# Patient Record
Sex: FEMALE | Race: WHITE | RURAL
[De-identification: names, ages, dates, MRNs, and addresses within clinical notes are randomized per-mention and may not be internally consistent; named-entity substitution may affect disease eponyms.]

---

## 2017-01-03 RX ORDER — CITALOPRAM 20 MG/1
TABLET, FILM COATED ORAL
Qty: 30 TAB | Refills: 3 | Status: SHIPPED | OUTPATIENT
Start: 2017-01-03 | End: 2017-02-27 | Stop reason: SDUPTHER

## 2017-02-16 ENCOUNTER — TELEPHONE (OUTPATIENT)
Dept: FAMILY MEDICINE CLINIC | Age: 32
End: 2017-02-16

## 2017-02-16 RX ORDER — AMLODIPINE BESYLATE 5 MG/1
TABLET ORAL
Qty: 30 TAB | Refills: 4 | OUTPATIENT
Start: 2017-02-16

## 2017-02-16 NOTE — TELEPHONE ENCOUNTER
Pt is requesting refill. Vale's pt she has never seen you. Would you like to refill or does pt need an appointment with you?

## 2017-02-16 NOTE — TELEPHONE ENCOUNTER
Pt made an appointment to be seen on 2/27 could we send in enough amlodipine to last her until her appt. She is completely out.

## 2017-02-17 RX ORDER — AMLODIPINE BESYLATE 5 MG/1
TABLET ORAL
Qty: 30 TAB | Refills: 0 | Status: SHIPPED | OUTPATIENT
Start: 2017-02-17 | End: 2017-02-27 | Stop reason: SINTOL

## 2017-02-27 ENCOUNTER — OFFICE VISIT (OUTPATIENT)
Dept: FAMILY MEDICINE CLINIC | Age: 32
End: 2017-02-27

## 2017-02-27 VITALS
HEART RATE: 88 BPM | TEMPERATURE: 97.6 F | BODY MASS INDEX: 34.66 KG/M2 | DIASTOLIC BLOOD PRESSURE: 88 MMHG | SYSTOLIC BLOOD PRESSURE: 148 MMHG | WEIGHT: 208 LBS | RESPIRATION RATE: 18 BRPM | HEIGHT: 65 IN | OXYGEN SATURATION: 100 %

## 2017-02-27 DIAGNOSIS — F41.1 GAD (GENERALIZED ANXIETY DISORDER): ICD-10-CM

## 2017-02-27 DIAGNOSIS — I10 ESSENTIAL HYPERTENSION: Primary | ICD-10-CM

## 2017-02-27 RX ORDER — CITALOPRAM 20 MG/1
TABLET, FILM COATED ORAL
Qty: 90 TAB | Refills: 1 | Status: SHIPPED | OUTPATIENT
Start: 2017-02-27 | End: 2018-02-05 | Stop reason: SDUPTHER

## 2017-02-27 RX ORDER — METHYLDOPA 500 MG/1
500 TABLET, FILM COATED ORAL 3 TIMES DAILY
Qty: 270 TAB | Refills: 1 | Status: SHIPPED | OUTPATIENT
Start: 2017-02-27 | End: 2017-03-28 | Stop reason: ALTCHOICE

## 2017-02-27 NOTE — MR AVS SNAPSHOT
Visit Information Date & Time Provider Department Dept. Phone Encounter #  
 2/27/2017  2:00 PM Heide Sandra MD [place] [address] [facility] [phone] [id number] Upcoming Health Maintenance Date Due DTaP/Tdap/Td series (1 - Tdap) 4/2/2006 PAP AKA CERVICAL CYTOLOGY 4/2/2006 INFLUENZA AGE 9 TO ADULT 8/1/2016 Allergies as of 2/27/2017  Review Complete On: 2/27/2017 By: Heide Sandra MD  
  
 Severity Noted Reaction Type Reactions Augmentin [Amoxicillin-pot Clavulanate]  02/25/2015    Other (comments) Intolerance to drug Current Immunizations  Never Reviewed No immunizations on file. Not reviewed this visit Vitals BP  
  
  
  
  
  
 148/88 (BP 1 Location: Left arm, BP Patient Position: Sitting) Vitals History BMI and BSA Data Body Mass Index Body Surface Area  
 34.61 kg/m 2 2.08 m 2 Preferred Pharmacy Pharmacy Name Phone [facility] [place] [address], [place]  [address] [phone] Your Updated Medication List  
  
   
This list is accurate as of: 2/27/17  2:43 PM.  Always use your most recent med list. amLODIPine 5 mg tablet Commonly known as:  Elyn Coffer TAKE ONE TABLET BY MOUTH ONCE DAILY  
  
 citalopram 20 mg tablet Commonly known as:  CELEXA  
TAKE ONE TABLET BY MOUTH ONCE DAILY predniSONE 20 mg tablet Commonly known as:  Therman Rail Take 5 tablets day one, take 4 tablets day two, take 3 tablets day three, take 2 tablets day four, take 1 tablet day five. pseudoephedrine  mg CR tablet Commonly known as:  SUDAFED 12 HOUR Take 1 Tab by mouth two (2) times daily as needed for Congestion. [address] [facility] (68) 0.25-35 mg-mcg Tab Generic drug:  norgestimate-ethinyl estradiol Introducing [facility] & HEALTH SERVICES!    
 [facility] introduces [employer] patient portal. Now you can access parts of your medical record, email your doctor's office, and request medication refills online. 1. In your internet browser, go to [email or web address] 2. Click on the First Time User? Click Here link in the Sign In box. You will see the New Member Sign Up page. 3. Enter your [facility] Access Code exactly as it appears below. You will not need to use this code after youve completed the sign-up process. If you do not sign up before the expiration date, you must request a new code. · [facility] Access Code: [email or web address] Expires: 2/28/2017  3:51 PM 
 
4. Enter the last four digits of your Social Security Number (xxxx) and Date of Birth (mm/dd/yyyy) as indicated and click Submit. You will be taken to the next sign-up page. 5. Create a [facility] ID. This will be your [facility] login ID and cannot be changed, so think of one that is secure and easy to remember. 6. Create a [facility] password. You can change your password at any time. 7. Enter your Password Reset Question and Answer. This can be used at a later time if you forget your password. 8. Enter your e-mail address. You will receive e-mail notification when new information is available in [address] 9. Click Sign Up. You can now view and download portions of your medical record. 10. Click the Download Summary menu link to download a portable copy of your medical information. If you have questions, please visit the Frequently Asked Questions section of the [facility] website. Remember, [facility] is NOT to be used for urgent needs. For medical emergencies, dial 911. Now available from your iPhone and Android! Please provide this summary of care documentation to your next provider. Your primary care clinician is listed as Mile Ferrari. If you have any questions after today's visit, please call [phone].

## 2017-02-27 NOTE — PROGRESS NOTES
Tato Browne is a 32 y.o. female who presents with the following:  Chief Complaint   Patient presents with    Anxiety    Hypertension       Anxiety   The history is provided by the patient (Patient doing well on her current medication of citalopram for generalized anxiety disorder without feeling overly stressed). Pertinent negatives include no chest pain, no abdominal pain, no headaches and no shortness of breath. Hypertension    The history is provided by the patient (The patient had a rather high salt lunch which resulted in her blood pressure being elevated today and she is on a rather relatively small amount of amlodipine but is considering a pregnancy this summer). Associated symptoms include anxiety. Pertinent negatives include no chest pain, no orthopnea, no palpitations, no PND, no blurred vision, no headaches, no peripheral edema, no dizziness and no shortness of breath. Allergies   Allergen Reactions    Augmentin [Amoxicillin-Pot Clavulanate] Other (comments)     Intolerance to drug       Current Outpatient Prescriptions   Medication Sig    citalopram (CELEXA) 20 mg tablet TAKE ONE TABLET BY MOUTH ONCE DAILY    methyldopa (ALDOMET) 500 mg tablet Take 1 Tab by mouth three (3) times daily.  predniSONE (DELTASONE) 20 mg tablet Take 5 tablets day one, take 4 tablets day two, take 3 tablets day three, take 2 tablets day four, take 1 tablet day five.  pseudoephedrine CR (SUDAFED 12 HOUR) 120 mg CR tablet Take 1 Tab by mouth two (2) times daily as needed for Congestion.  SPRINTEC, 28, 0.25-35 mg-mcg per tablet      No current facility-administered medications for this visit. No past medical history on file. Past Surgical History:   Procedure Laterality Date    HX GYN       I, Para I    HX TONSILLECTOMY         No family history on file.     Social History     Social History    Marital status:      Spouse name: N/A    Number of children: 1    Years of education: N/A     Occupational History    insurance      Social History Main Topics    Smoking status: Former Smoker    Smokeless tobacco: Never Used    Alcohol use Yes      Comment: social    Drug use: No    Sexual activity: Yes     Partners: Male     Birth control/ protection: Pill     Other Topics Concern    None     Social History Narrative       Review of Systems   Eyes: Negative for blurred vision. Respiratory: Negative for shortness of breath. Cardiovascular: Negative for chest pain, palpitations, orthopnea and PND. Gastrointestinal: Negative for abdominal pain. Neurological: Negative for dizziness and headaches. Visit Vitals    /88 (BP 1 Location: Left arm, BP Patient Position: Sitting)    Pulse 88    Temp 97.6 °F (36.4 °C) (Oral)    Resp 18    Ht 5' 5\" (1.651 m)    Wt 208 lb (94.3 kg)    LMP 02/24/2017 (Exact Date)    SpO2 100%    BMI 34.61 kg/m2     Physical Exam   Constitutional: She is oriented to person, place, and time and well-developed, well-nourished, and in no distress. The patient is overweight   HENT:   Head: Normocephalic and atraumatic. Right Ear: External ear normal.   Left Ear: External ear normal.   Mouth/Throat: Oropharynx is clear and moist.   Eyes: Conjunctivae and EOM are normal. Pupils are equal, round, and reactive to light. Right eye exhibits no discharge. Left eye exhibits no discharge. Neck: Normal range of motion. Neck supple. No tracheal deviation present. No thyromegaly present. Cardiovascular: Normal rate, regular rhythm, normal heart sounds and intact distal pulses. Exam reveals no gallop and no friction rub. No murmur heard. Pulmonary/Chest: Effort normal and breath sounds normal. No respiratory distress. She has no wheezes. She exhibits no tenderness. Abdominal: Soft. Bowel sounds are normal. She exhibits no distension and no mass. There is no tenderness. There is no rebound and no guarding.    Musculoskeletal: She exhibits no edema or tenderness. Lymphadenopathy:     She has no cervical adenopathy. Neurological: She is alert and oriented to person, place, and time. She has normal reflexes. No cranial nerve deficit. She exhibits normal muscle tone. Gait normal. Coordination normal.   Skin: Skin is warm and dry. No rash noted. No erythema. No pallor. Psychiatric: Mood, memory, affect and judgment normal.         ICD-10-CM ICD-9-CM    1. Essential hypertension I10 401.9 methyldopa (ALDOMET) 500 mg tablet      METABOLIC PANEL, COMPREHENSIVE      LIPID PANEL   2. NEREYDA (generalized anxiety disorder) F41.1 300.02 citalopram (CELEXA) 20 mg tablet     The patient will return and approximately 4 weeks to have her lab drawn and her blood pressure rechecked on the new medication. Orders Placed This Encounter    METABOLIC PANEL, COMPREHENSIVE     Standing Status:   Future     Standing Expiration Date:   8/27/2017    LIPID PANEL     Standing Status:   Future     Standing Expiration Date:   8/27/2017    citalopram (CELEXA) 20 mg tablet     Sig: TAKE ONE TABLET BY MOUTH ONCE DAILY     Dispense:  90 Tab     Refill:  1    methyldopa (ALDOMET) 500 mg tablet     Sig: Take 1 Tab by mouth three (3) times daily.      Dispense:  270 Tab     Refill:  1       Follow-up Disposition: Not on Gerianne Osgood, MD

## 2017-03-28 ENCOUNTER — OFFICE VISIT (OUTPATIENT)
Dept: FAMILY MEDICINE CLINIC | Age: 32
End: 2017-03-28

## 2017-03-28 DIAGNOSIS — Z00.00 ANNUAL PHYSICAL EXAM: Primary | ICD-10-CM

## 2017-03-28 RX ORDER — LABETALOL 100 MG/1
100 TABLET, FILM COATED ORAL 2 TIMES DAILY
Qty: 60 TAB | Refills: 11 | Status: SHIPPED | OUTPATIENT
Start: 2017-03-28 | End: 2018-02-05 | Stop reason: ALTCHOICE

## 2017-03-28 RX ORDER — PHENTERMINE HYDROCHLORIDE 30 MG/1
30 CAPSULE ORAL
COMMUNITY
End: 2018-09-13 | Stop reason: ALTCHOICE

## 2017-03-28 RX ORDER — AMLODIPINE BESYLATE 5 MG/1
5 TABLET ORAL DAILY
COMMUNITY
Start: 2017-02-17 | End: 2017-03-28 | Stop reason: ALTCHOICE

## 2017-03-28 NOTE — PROGRESS NOTES
Chief Complaint   Patient presents with    Hypertension         HPI:       is a 32 y.o. female.  mother of an 7 yo daughter, she works at [employer] in [facility]. Hypertensive and previously managed with Amlodipine. Seen elsewhere and switched to Aldomet (which she chose not to take), she was advised by her OB to consider Labetalol instead. Not pregnant now, but considering this soon. Allergies   Allergen Reactions    Augmentin [Amoxicillin-Pot Clavulanate] Other (comments)     Intolerance to drug       Current Outpatient Prescriptions   Medication Sig    phentermine 30 mg capsule Take 30 mg by mouth every morning.  labetalol (NORMODYNE) 100 mg tablet Take 1 Tab by mouth two (2) times a day. Indications: hypertension    citalopram (CELEXA) 20 mg tablet TAKE ONE TABLET BY MOUTH ONCE DAILY    SPRINTEC, 28, 0.25-35 mg-mcg per tablet      No current facility-administered medications for this visit. No past medical history on file. ROS:  Denies fever, chills, cough, chest pain, SOB,  nausea, vomiting, or diarrhea. Denies wt loss, wt gain, hemoptysis, hematochezia or melena. Physical Examination:    LMP 02/24/2017 (Exact Date)    General: Alert and Ox3, Fluent speech  HEENT:  NC/AT, EOMI, OP: clear  Neck:  Supple, no adenopathy, JVD, mass or bruit  Chest:  Clear to Ausculation, without wheezes, rales, rubs or ronchi  Cardiac: RRR  Abdomen:  +BS, soft, nontender without palpable HSM  Extremities:  No cyanosis, clubbing or edema  Neurologic:  Ambulatory without assist, CN 2-12 grossly intact. Moves all extremities. Skin: no rash  Lymphadenopathy: no cervical or supraclavicular nodes      ASSESSMENT AND PLAN:     1.  Labs today including Vitamin D, TSH, Lipids, CBC, CMP  2. Home BP monitoring. Contingency: Labetalol 100 mg BID  3. RTC in 6 months or when she becomes pregnant.     Orders Placed This Encounter    VITAMIN D, 25 HYDROXY    TSH 3RD GENERATION    CBC WITH AUTOMATED DIFF    LIPID PANEL    METABOLIC PANEL, COMPREHENSIVE    MS COLLECTION VENOUS BLOOD,VENIPUNCTURE    DISCONTD: amLODIPine (NORVASC) 5 mg tablet     Sig: Take 5 mg by mouth daily.  phentermine 30 mg capsule     Sig: Take 30 mg by mouth every morning.  labetalol (NORMODYNE) 100 mg tablet     Sig: Take 1 Tab by mouth two (2) times a day.  Indications: hypertension     Dispense:  60 Tab     Refill:  [address] Leeroy Dubon MD, [address]

## 2017-03-28 NOTE — PATIENT INSTRUCTIONS
If you have any questions regarding [employer], you may call [employer] support at [phone]. If you have any questions regarding [employer], you may call [employer] support at [phone].

## 2017-03-28 NOTE — MR AVS SNAPSHOT
Visit Information Date & Time Provider Department Dept. Phone Encounter #  
 3/28/2017  1:30 PM Brooke Edwards MD [place] [id number] Upcoming Health Maintenance Date Due DTaP/Tdap/Td series (1 - Tdap) 4/2/2006 PAP AKA CERVICAL CYTOLOGY 4/2/2006 INFLUENZA AGE 9 TO ADULT 8/1/2016 Allergies as of 3/28/2017  Review Complete On: 3/28/2017 By: Brooke Edwards MD  
  
 Severity Noted Reaction Type Reactions Augmentin [Amoxicillin-pot Clavulanate]  02/25/2015    Other (comments) Intolerance to drug Current Immunizations  Never Reviewed No immunizations on file. Not reviewed this visit You Were Diagnosed With   
  
 Codes Comments Annual physical exam    -  Primary ICD-10-CM: Z00.00 ICD-9-CM: V70.0 Vitals LMP OB Status Smoking Status 02/24/2017 (Exact Date) Having regular periods Former Smoker Preferred Pharmacy Pharmacy Name Phone [facility], [place]  [address] [phone] Your Updated Medication List  
  
   
This list is accurate as of: 3/28/17  2:19 PM.  Always use your most recent med list.  
  
  
  
  
 citalopram 20 mg tablet Commonly known as:  CELEXA  
TAKE ONE TABLET BY MOUTH ONCE DAILY  
  
 labetalol 100 mg tablet Commonly known as:  Bola Carlos Take 1 Tab by mouth two (2) times a day. Indications: hypertension  
  
 phentermine 30 mg capsule Take 30 mg by mouth every morning. [address] () 0.25-35 mg-mcg Tab Generic drug:  norgestimate-ethinyl estradiol Prescriptions Printed Refills  
 labetalol (NORMODYNE) 100 mg tablet 11 Sig: Take 1 Tab by mouth two (2) times a day. Indications: hypertension Class: Print Route: Oral  
  
We Performed the Following CBC WITH AUTOMATED DIFF [41912 CPT(R)] LIPID PANEL [56216 CPT(R)] METABOLIC PANEL, COMPREHENSIVE [38914 CPT(R)] NM COLLECTION VENOUS BLOOD,VENIPUNCTURE [id number] CPT(R)] TSH 3RD GENERATION [70093 CPT(R)] VITAMIN D, 25 HYDROXY [id number] CPT(R)] Patient Instructions If you have any questions regarding [employer], you may call [employer] support at [phone]. Introducing [facility] & HEALTH SERVICES! Yamilet Cunningham introduces [employer] patient portal. Now you can access parts of your medical record, email your doctor's office, and request medication refills online. 1. In your internet browser, go to [email or web address]. [employer] 2. Click on the First Time User? Click Here link in the Sign In box. You will see the New Member Sign Up page. 3. Enter your [employer] Access Code exactly as it appears below. You will not need to use this code after youve completed the sign-up process. If you do not sign up before the expiration date, you must request a new code. · [employer] Access Code: [email or web address] Expires: 6/26/2017  2:19 PM 
 
4. Enter the last four digits of your Social Security Number (xxxx) and Date of Birth (mm/dd/yyyy) as indicated and click Submit. You will be taken to the next sign-up page. 5. Create a [employer] ID. This will be your [employer] login ID and cannot be changed, so think of one that is secure and easy to remember. 6. Create a [employer] password. You can change your password at any time. 7. Enter your Password Reset Question and Answer. This can be used at a later time if you forget your password. 8. Enter your e-mail address. You will receive e-mail notification when new information is available in [address] 9. Click Sign Up. You can now view and download portions of your medical record. 10. Click the Download Summary menu link to download a portable copy of your medical information. If you have questions, please visit the Frequently Asked Questions section of the [employer] website. Remember, [employer] is NOT to be used for urgent needs. For medical emergencies, dial 911. Now available from your iPhone and Android! Please provide this summary of care documentation to your next provider. Your primary care clinician is listed as Yahaira Sotomayor. If you have any questions after today's visit, please call [phone].

## 2017-03-28 NOTE — LETTER
NOTIFICATION RETURN TO WORK / SCHOOL 
 
3/28/2017 2:17 PM 
 
Ms. Mishel Tran [address] [place] [phone] To Whom It May Concern: 
 
Mishel Tran is currently under the care of Portillo Brown. Please excuse from work on 3/20 and 3/21/17.  
 
 
 
Sincerely 
 
, 
 
 
Desmond Polk MD

## 2017-03-29 LAB
25(OH)D3+25(OH)D2 SERPL-MCNC: 31.9 NG/ML (ref 30–100)
ALBUMIN SERPL-MCNC: 4.6 G/DL (ref 3.5–5.5)
ALBUMIN/GLOB SERPL: 2 {RATIO} (ref 1.2–2.2)
ALP SERPL-CCNC: 61 IU/L (ref 39–117)
ALT SERPL-CCNC: 36 IU/L (ref 0–32)
AST SERPL-CCNC: 24 IU/L (ref 0–40)
BASOPHILS # BLD AUTO: 0 X10E3/UL (ref 0–0.2)
BASOPHILS NFR BLD AUTO: 1 %
BILIRUB SERPL-MCNC: 0.7 MG/DL (ref 0–1.2)
BUN SERPL-MCNC: 11 MG/DL (ref 6–20)
BUN/CREAT SERPL: 18 (ref 8–20)
CALCIUM SERPL-MCNC: 9.9 MG/DL (ref 8.7–10.2)
CHLORIDE SERPL-SCNC: 100 MMOL/L (ref 96–106)
CHOLEST SERPL-MCNC: 212 MG/DL (ref 100–199)
CO2 SERPL-SCNC: 23 MMOL/L (ref 18–29)
CREAT SERPL-MCNC: 0.62 MG/DL (ref 0.57–1)
EOSINOPHIL # BLD AUTO: 0 X10E3/UL (ref 0–0.4)
EOSINOPHIL NFR BLD AUTO: 1 %
ERYTHROCYTE [DISTWIDTH] IN BLOOD BY AUTOMATED COUNT: 12.6 % (ref 12.3–15.4)
GLOBULIN SER CALC-MCNC: 2.3 G/DL (ref 1.5–4.5)
GLUCOSE SERPL-MCNC: 95 MG/DL (ref 65–99)
HCT VFR BLD AUTO: 39.9 % (ref 34–46.6)
HDLC SERPL-MCNC: 86 MG/DL
HGB BLD-MCNC: 13.2 G/DL (ref 11.1–15.9)
IMM GRANULOCYTES # BLD: 0 X10E3/UL (ref 0–0.1)
IMM GRANULOCYTES NFR BLD: 0 %
LDLC SERPL CALC-MCNC: 105 MG/DL (ref 0–99)
LYMPHOCYTES # BLD AUTO: 1.8 X10E3/UL (ref 0.7–3.1)
LYMPHOCYTES NFR BLD AUTO: 35 %
MCH RBC QN AUTO: 28.6 PG (ref 26.6–33)
MCHC RBC AUTO-ENTMCNC: 33.1 G/DL (ref 31.5–35.7)
MCV RBC AUTO: 87 FL (ref 79–97)
MONOCYTES # BLD AUTO: 0.4 X10E3/UL (ref 0.1–0.9)
MONOCYTES NFR BLD AUTO: 9 %
NEUTROPHILS # BLD AUTO: 2.8 X10E3/UL (ref 1.4–7)
NEUTROPHILS NFR BLD AUTO: 54 %
PLATELET # BLD AUTO: 326 X10E3/UL (ref 150–379)
POTASSIUM SERPL-SCNC: 4.2 MMOL/L (ref 3.5–5.2)
PROT SERPL-MCNC: 6.9 G/DL (ref 6–8.5)
RBC # BLD AUTO: 4.61 X10E6/UL (ref 3.77–5.28)
SODIUM SERPL-SCNC: 139 MMOL/L (ref 134–144)
TRIGL SERPL-MCNC: 106 MG/DL (ref 0–149)
TSH SERPL DL<=0.005 MIU/L-ACNC: 2.37 UIU/ML (ref 0.45–4.5)
VLDLC SERPL CALC-MCNC: 21 MG/DL (ref 5–40)
WBC # BLD AUTO: 5.1 X10E3/UL (ref 3.4–10.8)

## 2018-01-30 ENCOUNTER — TELEPHONE (OUTPATIENT)
Dept: FAMILY MEDICINE CLINIC | Age: 33
End: 2018-01-30

## 2018-02-05 ENCOUNTER — OFFICE VISIT (OUTPATIENT)
Dept: FAMILY MEDICINE CLINIC | Age: 33
End: 2018-02-05

## 2018-02-05 VITALS
HEIGHT: 65 IN | HEART RATE: 70 BPM | OXYGEN SATURATION: 98 % | RESPIRATION RATE: 16 BRPM | WEIGHT: 209 LBS | SYSTOLIC BLOOD PRESSURE: 138 MMHG | BODY MASS INDEX: 34.82 KG/M2 | DIASTOLIC BLOOD PRESSURE: 88 MMHG

## 2018-02-05 DIAGNOSIS — F41.1 GAD (GENERALIZED ANXIETY DISORDER): ICD-10-CM

## 2018-02-05 DIAGNOSIS — I10 ESSENTIAL HYPERTENSION: Primary | ICD-10-CM

## 2018-02-05 RX ORDER — CITALOPRAM 20 MG/1
TABLET, FILM COATED ORAL
Qty: 90 TAB | Refills: 4 | Status: SHIPPED | OUTPATIENT
Start: 2018-02-05 | End: 2019-05-08 | Stop reason: SDUPTHER

## 2018-02-05 NOTE — PROGRESS NOTES
Chief Complaint   Patient presents with    Medication Evaluation         HPI:       is a 28 y.o. female. . Has 6 yo daughter. Works at [employer]. 3 week trip to [place] last summer. Planning trip to [facility] soon. Tearful at times. Off all meds but would like to resume Celexa during the winter. Allergies   Allergen Reactions    Augmentin [Amoxicillin-Pot Clavulanate] Other (comments)     Intolerance to drug       Current Outpatient Prescriptions   Medication Sig    phentermine 30 mg capsule Take 30 mg by mouth every morning.  labetalol (NORMODYNE) 100 mg tablet Take 1 Tab by mouth two (2) times a day. Indications: hypertension    citalopram (CELEXA) 20 mg tablet TAKE ONE TABLET BY MOUTH ONCE DAILY    SPRINTEC, 28, 0.25-35 mg-mcg per tablet      No current facility-administered medications for this visit. No past medical history on file. ROS:  Denies fever, chills, cough, chest pain, SOB,  nausea, vomiting, or diarrhea. Denies wt loss, wt gain, hemoptysis, hematochezia or melena. Physical Examination:    /88 (BP 1 Location: Left arm, BP Patient Position: Sitting)  Pulse 70  Resp 16  Ht 5' 5\" (1.651 m)  Wt 209 lb (94.8 kg)  SpO2 98%  BMI 34.78 kg/m2    General: Alert and Ox3, Fluent speech  HEENT:  NC/AT, EOMI, OP: clear  Neck:  Supple, no adenopathy, JVD, mass or bruit  Chest:  Clear to Ausculation, without wheezes, rales, rubs or ronchi  Cardiac: RRR  Abdomen:  +BS, soft, nontender without palpable HSM  Extremities:  No cyanosis, clubbing or edema  Neurologic:  Ambulatory without assist, CN 2-12 grossly intact. Moves all extremities. Skin: no rash  Lymphadenopathy: no cervical or supraclavicular nodes      ASSESSMENT AND PLAN:     1. NEREYDA: resume Celexa  2. HTN is well controlled.   3.  Labs today    Orders Placed This Encounter    TSH 3RD GENERATION    CBC WITH AUTOMATED DIFF    METABOLIC PANEL, COMPREHENSIVE    LIPID PANEL Kalie La MD, [address]

## 2018-02-05 NOTE — MR AVS SNAPSHOT
[address] [place] [address] [address] [employer] [address] 
[phone] Patient: Puja Joseph MRN: [id number] Visit Information Date & Time Provider Department Dept. Phone Encounter #  
 2/5/2018  2:30 PM Praneeth PratherHector 72 [phone] [id number] Upcoming Health Maintenance Date Due DTaP/Tdap/Td series (1 - Tdap) 4/2/2006 PAP AKA CERVICAL CYTOLOGY 4/2/2006 Allergies as of 2/5/2018  Review Complete On: 2/5/2018 By: Praneeth Prather MD  
  
 Severity Noted Reaction Type Reactions Augmentin [Amoxicillin-pot Clavulanate]  02/25/2015    Other (comments) Intolerance to drug Current Immunizations  Never Reviewed No immunizations on file. Not reviewed this visit You Were Diagnosed With   
  
 Codes Comments Essential hypertension    -  Primary ICD-10-CM: I10 
ICD-9-CM: 401.9 NEREYDA (generalized anxiety disorder)     ICD-10-CM: F41.1 ICD-9-CM: 300.02 Vitals BP Pulse Resp Height(growth percentile) Weight(growth percentile) SpO2  
 138/88 (BP 1 Location: Left arm, BP Patient Position: Sitting) 70 16 5' 5\" (1.651 m) 209 lb (94.8 kg) 98% BMI OB Status Smoking Status 34.78 kg/m2 Having regular periods Former Smoker BMI and BSA Data Body Mass Index Body Surface Area 34.78 kg/m 2 2.09 m 2 Preferred Pharmacy Pharmacy Name Phone [address] \[facility] [place] [facility]\"" 91, [address] [place] [phone] Your Updated Medication List  
  
   
This list is accurate as of: 2/5/18  3:15 PM.  Always use your most recent med list.  
  
  
  
  
 citalopram 20 mg tablet Commonly known as:  CELEXA  
TAKE ONE TABLET BY MOUTH ONCE DAILY  
  
 labetalol 100 mg tablet Commonly known as:  Dannis Moira Take 1 Tab by mouth two (2) times a day. Indications: hypertension  
  
 phentermine 30 mg capsule Take 30 mg by mouth every morning. [address] [facility] (28) 0.25-35 mg-mcg Tab Generic drug:  norgestimate-ethinyl estradiol We Performed the Following CBC WITH AUTOMATED DIFF [19108 CPT(R)] LIPID PANEL [15997 CPT(R)] METABOLIC PANEL, COMPREHENSIVE [92591 CPT(R)] TSH 3RD GENERATION [44867 CPT(R)] Introducing [facility] SERVICES! Dear Yair Silva: Thank you for requesting a [employer] account. Our records indicate that you already have an active [employer] account. You can access your account anytime at [email or web address]. [employer] Did you know that you can access your hospital and ER discharge instructions at any time in [employer]? You can also review all of your test results from your hospital stay or ER visit. Additional Information If you have questions, please visit the Frequently Asked Questions section of the [employer] website at [email or web address] [email or web address]/. Remember, [employer] is NOT to be used for urgent needs. For medical emergencies, dial 911. Now available from your iPhone and Android! Please provide this summary of care documentation to your next provider. Your primary care clinician is listed as Cade Stewart. If you have any questions after today's visit, please call [phone].

## 2018-02-06 LAB
ALBUMIN SERPL-MCNC: 4.4 G/DL (ref 3.5–5.5)
ALBUMIN/GLOB SERPL: 2.3 {RATIO} (ref 1.2–2.2)
ALP SERPL-CCNC: 60 IU/L (ref 39–117)
ALT SERPL-CCNC: 12 IU/L (ref 0–32)
AST SERPL-CCNC: 13 IU/L (ref 0–40)
BASOPHILS # BLD AUTO: 0 X10E3/UL (ref 0–0.2)
BASOPHILS NFR BLD AUTO: 1 %
BILIRUB SERPL-MCNC: 0.5 MG/DL (ref 0–1.2)
BUN SERPL-MCNC: 10 MG/DL (ref 6–20)
BUN/CREAT SERPL: 16 (ref 9–23)
CALCIUM SERPL-MCNC: 9.1 MG/DL (ref 8.7–10.2)
CHLORIDE SERPL-SCNC: 104 MMOL/L (ref 96–106)
CHOLEST SERPL-MCNC: 166 MG/DL (ref 100–199)
CO2 SERPL-SCNC: 21 MMOL/L (ref 18–29)
CREAT SERPL-MCNC: 0.62 MG/DL (ref 0.57–1)
EOSINOPHIL # BLD AUTO: 0.1 X10E3/UL (ref 0–0.4)
EOSINOPHIL NFR BLD AUTO: 2 %
ERYTHROCYTE [DISTWIDTH] IN BLOOD BY AUTOMATED COUNT: 12.8 % (ref 12.3–15.4)
GFR SERPLBLD CREATININE-BSD FMLA CKD-EPI: 120 ML/MIN/1.73
GFR SERPLBLD CREATININE-BSD FMLA CKD-EPI: 138 ML/MIN/1.73
GLOBULIN SER CALC-MCNC: 1.9 G/DL (ref 1.5–4.5)
GLUCOSE SERPL-MCNC: 90 MG/DL (ref 65–99)
HCT VFR BLD AUTO: 39.2 % (ref 34–46.6)
HDLC SERPL-MCNC: 70 MG/DL
HGB BLD-MCNC: 13.1 G/DL (ref 11.1–15.9)
IMM GRANULOCYTES # BLD: 0 X10E3/UL (ref 0–0.1)
IMM GRANULOCYTES NFR BLD: 0 %
LDLC SERPL CALC-MCNC: 80 MG/DL (ref 0–99)
LYMPHOCYTES # BLD AUTO: 1.9 X10E3/UL (ref 0.7–3.1)
LYMPHOCYTES NFR BLD AUTO: 35 %
MCH RBC QN AUTO: 29.2 PG (ref 26.6–33)
MCHC RBC AUTO-ENTMCNC: 33.4 G/DL (ref 31.5–35.7)
MCV RBC AUTO: 87 FL (ref 79–97)
MONOCYTES # BLD AUTO: 0.5 X10E3/UL (ref 0.1–0.9)
MONOCYTES NFR BLD AUTO: 9 %
NEUTROPHILS # BLD AUTO: 2.8 X10E3/UL (ref 1.4–7)
NEUTROPHILS NFR BLD AUTO: 53 %
PLATELET # BLD AUTO: 303 X10E3/UL (ref 150–379)
POTASSIUM SERPL-SCNC: 4.5 MMOL/L (ref 3.5–5.2)
PROT SERPL-MCNC: 6.3 G/DL (ref 6–8.5)
RBC # BLD AUTO: 4.49 X10E6/UL (ref 3.77–5.28)
SODIUM SERPL-SCNC: 139 MMOL/L (ref 134–144)
TRIGL SERPL-MCNC: 78 MG/DL (ref 0–149)
TSH SERPL DL<=0.005 MIU/L-ACNC: 2.95 UIU/ML (ref 0.45–4.5)
VLDLC SERPL CALC-MCNC: 16 MG/DL (ref 5–40)
WBC # BLD AUTO: 5.2 X10E3/UL (ref 3.4–10.8)

## 2018-03-08 ENCOUNTER — TELEPHONE (OUTPATIENT)
Dept: FAMILY MEDICINE CLINIC | Age: 33
End: 2018-03-08

## 2018-03-08 NOTE — TELEPHONE ENCOUNTER
Just got back from a trip, had been dizzy, just didn't feel right, took /110 to 150/100. No headache just really dizzy.  Scheduled to be seen

## 2018-03-09 ENCOUNTER — OFFICE VISIT (OUTPATIENT)
Dept: FAMILY MEDICINE CLINIC | Age: 33
End: 2018-03-09

## 2018-03-09 VITALS
OXYGEN SATURATION: 97 % | RESPIRATION RATE: 17 BRPM | DIASTOLIC BLOOD PRESSURE: 92 MMHG | HEART RATE: 94 BPM | WEIGHT: 195.6 LBS | BODY MASS INDEX: 32.59 KG/M2 | SYSTOLIC BLOOD PRESSURE: 142 MMHG | HEIGHT: 65 IN

## 2018-03-09 DIAGNOSIS — I10 ESSENTIAL HYPERTENSION: Primary | ICD-10-CM

## 2018-03-09 RX ORDER — HYDROCHLOROTHIAZIDE 12.5 MG/1
12.5 TABLET ORAL DAILY
Qty: 30 TAB | Refills: 2 | Status: SHIPPED | OUTPATIENT
Start: 2018-03-09 | End: 2018-06-05 | Stop reason: SDUPTHER

## 2018-03-09 NOTE — PROGRESS NOTES
Chief Complaint   Patient presents with    Hypertension     for the past few days, headaches and dizziness. HPI:       is a 28 y.o. female. She is a . New Issues:  Just got back from a trip to [facility], had been dizzy, just didn't feel right, took /110 to 150/100. No headache. Still feels slightly dizzy. She has been eating a lot of salty foods the last week and had more ETOH then she is used to having. Allergies   Allergen Reactions    Augmentin [Amoxicillin-Pot Clavulanate] Other (comments)     Intolerance to drug       Current Outpatient Prescriptions   Medication Sig    citalopram (CELEXA) 20 mg tablet TAKE ONE TABLET BY MOUTH ONCE DAILY    SPRINTEC, 28, 0.25-35 mg-mcg per tablet     phentermine 30 mg capsule Take 30 mg by mouth every morning. No current facility-administered medications for this visit. No past medical history on file. Past Surgical History:   Procedure Laterality Date    HX GYN       I, Para I    HX TONSILLECTOMY         Social History     Social History    Marital status:      Spouse name: N/A    Number of children: 1    Years of education: N/A     Occupational History    insurance      Social History Main Topics    Smoking status: Former Smoker    Smokeless tobacco: Never Used    Alcohol use Yes      Comment: social    Drug use: No    Sexual activity: Yes     Partners: Male     Birth control/ protection: Pill     Other Topics Concern    None     Social History Narrative       No family history on file. Above history reviewed. ROS:  Denies fever, chills, cough, chest pain, SOB,  nausea, vomiting, or diarrhea. Denies wt loss, wt gain, hemoptysis, hematochezia or melena.     Physical Examination:    BP (!) 142/92 (BP 1 Location: Left arm, BP Patient Position: Sitting)  Pulse 94  Resp 17  Ht 5' 5\" (1.651 m)  Wt 195 lb 9.6 oz (88.7 kg)  SpO2 97%  BMI 32.55 kg/m2    General: Alert and Ox3, Fluent speech  Neck:  Supple, no adenopathy, JVD, mass or bruit  Chest:  Clear to Ausculation, without wheezes, rales, rubs or ronchi  Cardiac: RRR  Extremities:  No cyanosis, clubbing or edema  Neurologic:  Ambulatory without assist, CN 2-12 grossly intact. Moves all extremities. Skin: no rash  Lymphadenopathy: no cervical or supraclavicular nodes    ASSESSMENT AND PLAN:     1. Essential hypertension  Starting HCTZ  Educated on low sodium diet. - hydroCHLOROthiazide (HYDRODIURIL) 12.5 mg tablet; Take 1 Tab by mouth daily. Indications: hypertension  Dispense: 30 Tab;  Refill: 2     RTC in 2 weeks    Kaylene Sacks, NP

## 2018-03-09 NOTE — MR AVS SNAPSHOT
Joselyn Head 
 
 
 [address] [address] [phone] Patient: Tracey Mejias MRN: [id number] LELA:0[birth year] Visit Information Date & Time Provider Department Dept. Phone Encounter #  
 3/9/2018 11:30 AM Shannon Trujillo NP Alison Wsie [id number] Your Appointments 3/26/2018  3:40 PM  
ESTABLISHED PATIENT with Shannon Trujillo NP  
Portillo Brown ([address]) Appt Note: 2 wk f/u  
 [address] [address] [phone]  
  
   
 [address] [address] Upcoming Health Maintenance Date Due DTaP/Tdap/Td series (1 - Tdap) 4/2/2006 PAP AKA CERVICAL CYTOLOGY 4/2/2006 Allergies as of 3/9/2018  Review Complete On: 3/9/2018 By: Shannon Trujillo NP Severity Noted Reaction Type Reactions Augmentin [Amoxicillin-pot Clavulanate]  02/25/2015    Other (comments) Intolerance to drug Current Immunizations  Never Reviewed No immunizations on file. Not reviewed this visit You Were Diagnosed With   
  
 Codes Comments Essential hypertension    -  Primary ICD-10-CM: I10 
ICD-9-CM: 401.9 Vitals BP Pulse Resp Height(growth percentile) Weight(growth percentile) SpO2  
 (!) 142/92 (BP 1 Location: Left arm, BP Patient Position: Sitting) 94 17 5' 5\" (1.651 m) 195 lb 9.6 oz (88.7 kg) 97% BMI OB Status Smoking Status 32.55 kg/m2 Having regular periods Former Smoker Vitals History BMI and BSA Data Body Mass Index Body Surface Area 32.55 kg/m 2 2.02 m 2 Preferred Pharmacy Pharmacy Name Phone [address] [phone] Your Updated Medication List  
  
   
This list is accurate as of 3/9/18 12:18 PM.  Always use your most recent med list.  
  
  
  
  
 citalopram 20 mg tablet Commonly known as:  Izabella Deerfield TAKE ONE TABLET BY MOUTH ONCE DAILY  
  
 hydroCHLOROthiazide 12.5 mg tablet Commonly known as:  HYDRODIURIL Take 1 Tab by mouth daily. Indications: hypertension  
  
 phentermine 30 mg capsule Take 30 mg by mouth every morning. [address] (28) 0.25-35 mg-mcg Tab Generic drug:  norgestimate-ethinyl estradiol Prescriptions Sent to Pharmacy Refills  
 hydroCHLOROthiazide (HYDRODIURIL) 12.5 mg tablet 2 Sig: Take 1 Tab by mouth daily. Indications: hypertension Class: Normal  
 Pharmacy: [address]  #: [phone] Route: Oral  
  
Patient Instructions If you have any questions regarding [employer], you may call [employer] support at [phone]. Introducing [address]! Dear Hermes Jurado: Thank you for requesting a [employer] account. Our records indicate that you already have an active [employer] account. You can access your account anytime at [email or web address] Did you know that you can access your hospital and ER discharge instructions at any time in [employer]? You can also review all of your test results from your hospital stay or ER visit. Additional Information If you have questions, please visit the Frequently Asked Questions section of the [employer] website at [email or web address] Remember, [employer] is NOT to be used for urgent needs. For medical emergencies, dial 911. Now available from your iPhone and Android! Please provide this summary of care documentation to your next provider. Your primary care clinician is listed as Rosalio Garrido. If you have any questions after today's visit, please call [phone].

## 2018-03-09 NOTE — PATIENT INSTRUCTIONS
If you have any questions regarding [employer], you may call [employer] support at [phone]. Low Sodium Diet (2,000 Milligram): Care Instructions  Your Care Instructions    Too much sodium causes your body to hold on to extra water. This can raise your blood pressure and force your heart and kidneys to work harder. In very serious cases, this could cause you to be put in the hospital. It might even be life-threatening. By limiting sodium, you will feel better and lower your risk of serious problems. The most common source of sodium is salt. People get most of the salt in their diet from canned, prepared, and packaged foods. Fast food and restaurant meals also are very high in sodium. Your doctor will probably limit your sodium to less than 2,000 milligrams (mg) a day. This limit counts all the sodium in prepared and packaged foods and any salt you add to your food. Follow-up care is a key part of your treatment and safety. Be sure to make and go to all appointments, and call your doctor if you are having problems. It's also a good idea to know your test results and keep a list of the medicines you take. How can you care for yourself at home? Read food labels  · Read labels on cans and food packages. The labels tell you how much sodium is in each serving. Make sure that you look at the serving size. If you eat more than the serving size, you have eaten more sodium. · Food labels also tell you the Percent Daily Value for sodium. Choose products with low Percent Daily Values for sodium. · Be aware that sodium can come in forms other than salt, including monosodium glutamate (MSG), sodium citrate, and sodium bicarbonate (baking soda). MSG is often added to Asian food. When you eat out, you can sometimes ask for food without MSG or added salt.   Buy low-sodium foods  · Buy foods that are labeled \"unsalted\" (no salt added), \"sodium-free\" (less than 5 mg of sodium per serving), or \"low-sodium\" (less than 140 mg of sodium per serving). Foods labeled \"reduced-sodium\" and \"light sodium\" may still have too much sodium. Be sure to read the label to see how much sodium you are getting. · Buy fresh vegetables, or frozen vegetables without added sauces. Buy low-sodium versions of canned vegetables, soups, and other canned goods. Prepare low-sodium meals  · Cut back on the amount of salt you use in cooking. This will help you adjust to the taste. Do not add salt after cooking. One teaspoon of salt has about 2,300 mg of sodium. · Take the salt shaker off the table. · Flavor your food with garlic, lemon juice, onion, vinegar, herbs, and spices. Do not use soy sauce, lite soy sauce, steak sauce, onion salt, garlic salt, celery salt, mustard, or ketchup on your food. · Use low-sodium salad dressings, sauces, and ketchup. Or make your own salad dressings and sauces without adding salt. · Use less salt (or none) when recipes call for it. You can often use half the salt a recipe calls for without losing flavor. Other foods such as rice, pasta, and grains do not need added salt. · Rinse canned vegetables, and cook them in fresh water. This removes some-but not all-of the salt. · Avoid water that is naturally high in sodium or that has been treated with water softeners, which add sodium. Call your local water company to find out the sodium content of your water supply. If you buy bottled water, read the label and choose a sodium-free brand. Avoid high-sodium foods  · Avoid eating:  ¨ Smoked, cured, salted, and canned meat, fish, and poultry. ¨ Ham, block, hot dogs, and luncheon meats. ¨ Regular, hard, and processed cheese and regular peanut butter. ¨ Crackers with salted tops, and other salted snack foods such as pretzels, chips, and salted popcorn. ¨ Frozen prepared meals, unless labeled low-sodium. ¨ Canned and dried soups, broths, and bouillon, unless labeled sodium-free or low-sodium.   ¨ Canned vegetables, unless labeled sodium-free or low-sodium. ¨ Western Amy fries, pizza, tacos, and other fast foods. ¨ Pickles, olives, ketchup, and other condiments, especially soy sauce, unless labeled sodium-free or low-sodium. Where can you learn more? Go to http://quentin-stefania.info/. Enter O985 in the search box to learn more about \"Low Sodium Diet (2,000 Milligram): Care Instructions. \"  Current as of: May 12, 2017  Content Version: 11.4  © 5612-0066 [employer] Care instructions adapted under license by [employer] (which disclaims liability or warranty for this information). If you have questions about a medical condition or this instruction, always ask your healthcare professional. [address] any warranty or liability for your use of this information.

## 2018-03-26 ENCOUNTER — OFFICE VISIT (OUTPATIENT)
Dept: FAMILY MEDICINE CLINIC | Age: 33
End: 2018-03-26

## 2018-03-26 VITALS
OXYGEN SATURATION: 97 % | SYSTOLIC BLOOD PRESSURE: 142 MMHG | BODY MASS INDEX: 32.36 KG/M2 | HEART RATE: 78 BPM | HEIGHT: 65 IN | WEIGHT: 194.2 LBS | RESPIRATION RATE: 17 BRPM | DIASTOLIC BLOOD PRESSURE: 90 MMHG

## 2018-03-26 DIAGNOSIS — I10 ESSENTIAL HYPERTENSION: Primary | ICD-10-CM

## 2018-03-26 RX ORDER — NORGESTIMATE AND ETHINYL ESTRADIOL 7DAYSX3 28
KIT ORAL
COMMUNITY
Start: 2018-03-22 | End: 2018-09-13

## 2018-03-26 RX ORDER — AMLODIPINE BESYLATE 5 MG/1
5 TABLET ORAL DAILY
Qty: 30 TAB | Refills: 5 | Status: SHIPPED | OUTPATIENT
Start: 2018-03-26 | End: 2018-10-08

## 2018-03-26 NOTE — PROGRESS NOTES
Chief Complaint   Patient presents with    Hypertension     follow up          HPI:       is a 28 y.o. female. She is an . New Issues:  Was seen 2 weeks ago after getting back from a trip to [facility]. She had been dizzy, just didn't feel right, took /110 to 150/100. Added HCTZ. Her BP has still been 140/90s at home. Was on Amlodipine in the past and tolerated well. Allergies   Allergen Reactions    Augmentin [Amoxicillin-Pot Clavulanate] Other (comments)     Intolerance to drug       Current Outpatient Prescriptions   Medication Sig    TRI-SPRINTEC, 28, 0.18/0.215/0.25 mg-35 mcg (28) tab     hydroCHLOROthiazide (HYDRODIURIL) 12.5 mg tablet Take 1 Tab by mouth daily. Indications: hypertension    citalopram (CELEXA) 20 mg tablet TAKE ONE TABLET BY MOUTH ONCE DAILY    phentermine 30 mg capsule Take 30 mg by mouth every morning. No current facility-administered medications for this visit. No past medical history on file. Past Surgical History:   Procedure Laterality Date    HX GYN       I, Para I    HX TONSILLECTOMY         Social History     Social History    Marital status:      Spouse name: N/A    Number of children: 1    Years of education: N/A     Occupational History    insurance      Social History Main Topics    Smoking status: Former Smoker    Smokeless tobacco: Never Used    Alcohol use Yes      Comment: social    Drug use: No    Sexual activity: Yes     Partners: Male     Birth control/ protection: Pill     Other Topics Concern    None     Social History Narrative       No family history on file. Above history reviewed. ROS:  Denies fever, chills, cough, chest pain, SOB,  nausea, vomiting, or diarrhea. Denies wt loss, wt gain, hemoptysis, hematochezia or melena.     Physical Examination:    /90 (BP 1 Location: Left arm, BP Patient Position: Sitting)  Pulse 78  Resp 17  Ht 5' 5\" (1.651 m)  Wt 194 lb 3.2 oz (88.1 kg)  SpO2 97%  BMI 32.32 kg/m2    Wt Readings from Last 3 Encounters:   03/26/18 194 lb 3.2 oz (88.1 kg)   03/09/18 195 lb 9.6 oz (88.7 kg)   02/05/18 209 lb (94.8 kg)      General: Alert and Ox3, Fluent speech  Neck:  Supple, no adenopathy, JVD, mass or bruit  Chest:  Clear to Ausculation, without wheezes, rales, rubs or ronchi  Cardiac: RRR  Extremities:  No cyanosis, clubbing or edema  Neurologic:  Ambulatory without assist, CN 2-12 grossly intact. Moves all extremities. Skin: no rash  Lymphadenopathy: no cervical or supraclavicular nodes    ASSESSMENT AND PLAN:     1. Essential hypertension  Not in goal range  Adding back Amlodipine  - amLODIPine (NORVASC) 5 mg tablet; Take 1 Tab by mouth daily. Dispense: 30 Tab;  Refill: 5     RTC in 2 weeks    Chante Irving NP

## 2018-03-26 NOTE — MR AVS SNAPSHOT
Senora Coffee 
 
 
 [address] [facility] [phone] Patient: Valeria Wood MRN: [id number] LHQ:[birth year] Visit Information Date & Time Provider Department Dept. Phone Encounter #  
 3/26/2018  3:40 PM Devno Agee NP [address] [id number] Upcoming Health Maintenance Date Due DTaP/Tdap/Td series (1 - Tdap) 4/2/2006 PAP AKA CERVICAL CYTOLOGY 4/2/2006 Allergies as of 3/26/2018  Review Complete On: 3/26/2018 By: Devon Agee NP Severity Noted Reaction Type Reactions Augmentin [Amoxicillin-pot Clavulanate]  02/25/2015    Other (comments) Intolerance to drug Current Immunizations  Never Reviewed No immunizations on file. Not reviewed this visit You Were Diagnosed With   
  
 Codes Comments Essential hypertension    -  Primary ICD-10-CM: I10 
ICD-9-CM: 401.9 Vitals BP Pulse Resp Height(growth percentile) Weight(growth percentile) SpO2  
 142/90 (BP 1 Location: Left arm, BP Patient Position: Sitting) 78 17 5' 5\" (1.651 m) 194 lb 3.2 oz (88.1 kg) 97% BMI OB Status Smoking Status 32.32 kg/m2 Having regular periods Former Smoker BMI and BSA Data Body Mass Index Body Surface Area  
 32.32 kg/m 2 2.01 m 2 Preferred Pharmacy Pharmacy Name Phone [address] [facility] Brett Zap[address] [phone] Your Updated Medication List  
  
   
This list is accurate as of 3/26/18  3:53 PM.  Always use your most recent med list. amLODIPine 5 mg tablet Commonly known as:  Chantelle Ant Take 1 Tab by mouth daily. citalopram 20 mg tablet Commonly known as:  CELEXA  
TAKE ONE TABLET BY MOUTH ONCE DAILY  
  
 hydroCHLOROthiazide 12.5 mg tablet Commonly known as:  HYDRODIURIL Take 1 Tab by mouth daily. Indications: hypertension  
  
 phentermine 30 mg capsule Take 30 mg by mouth every morning. TRI-SPRINTEC (28) 0.18/0.215/0.25 mg-35 mcg (28) Tab Generic drug:  norgestimate-ethinyl estradiol Prescriptions Sent to Pharmacy Refills  
 amLODIPine (NORVASC) 5 mg tablet 5 Sig: Take 1 Tab by mouth daily. Class: Normal  
 Pharmacy: [facility] [address] [facility] [address] Ph #: [phone] Route: Oral  
  
Patient Instructions If you have any questions regarding [employer], you may call [employer] support at [phone]. Introducing [facility]! Dear Andrea Halsted: Thank you for requesting a [employer] account. Our records indicate that you already have an active [employer] account. You can access your account anytime at [email or web address]. [email or web address] Did you know that you can access your hospital and ER discharge instructions at any time in [employer]? You can also review all of your test results from your hospital stay or ER visit. Additional Information If you have questions, please visit the Frequently Asked Questions section of the [employer] website at [email or web address] [email or web address]/. Remember, [employer] is NOT to be used for urgent needs. For medical emergencies, dial 911. Now available from your iPhone and Android! Please provide this summary of care documentation to your next provider. Your primary care clinician is listed as Aftab Kumar. If you have any questions after today's visit, please call [phone].

## 2018-03-26 NOTE — PATIENT INSTRUCTIONS
If you have any questions regarding Novavax AB, you may call Novavax AB support at [phone]. Low Sodium Diet (2,000 Milligram): Care Instructions  Your Care Instructions    Too much sodium causes your body to hold on to extra water. This can raise your blood pressure and force your heart and kidneys to work harder. In very serious cases, this could cause you to be put in the hospital. It might even be life-threatening. By limiting sodium, you will feel better and lower your risk of serious problems. The most common source of sodium is salt. People get most of the salt in their diet from canned, prepared, and packaged foods. Fast food and restaurant meals also are very high in sodium. Your doctor will probably limit your sodium to less than 2,000 milligrams (mg) a day. This limit counts all the sodium in prepared and packaged foods and any salt you add to your food. Follow-up care is a key part of your treatment and safety. Be sure to make and go to all appointments, and call your doctor if you are having problems. It's also a good idea to know your test results and keep a list of the medicines you take. How can you care for yourself at home? Read food labels  · Read labels on cans and food packages. The labels tell you how much sodium is in each serving. Make sure that you look at the serving size. If you eat more than the serving size, you have eaten more sodium. · Food labels also tell you the Percent Daily Value for sodium. Choose products with low Percent Daily Values for sodium. · Be aware that sodium can come in forms other than salt, including monosodium glutamate (MSG), sodium citrate, and sodium bicarbonate (baking soda). MSG is often added to Asian food. When you eat out, you can sometimes ask for food without MSG or added salt.   Buy low-sodium foods  · Buy foods that are labeled \"unsalted\" (no salt added), \"sodium-free\" (less than 5 mg of sodium per serving), or \"low-sodium\" (less than 140 mg of sodium per serving). Foods labeled \"reduced-sodium\" and \"light sodium\" may still have too much sodium. Be sure to read the label to see how much sodium you are getting. · Buy fresh vegetables, or frozen vegetables without added sauces. Buy low-sodium versions of canned vegetables, soups, and other canned goods. Prepare low-sodium meals  · Cut back on the amount of salt you use in cooking. This will help you adjust to the taste. Do not add salt after cooking. One teaspoon of salt has about 2,300 mg of sodium. · Take the salt shaker off the table. · Flavor your food with garlic, lemon juice, onion, vinegar, herbs, and spices. Do not use soy sauce, lite soy sauce, steak sauce, onion salt, garlic salt, celery salt, mustard, or ketchup on your food. · Use low-sodium salad dressings, sauces, and ketchup. Or make your own salad dressings and sauces without adding salt. · Use less salt (or none) when recipes call for it. You can often use half the salt a recipe calls for without losing flavor. Other foods such as rice, pasta, and grains do not need added salt. · Rinse canned vegetables, and cook them in fresh water. This removes some-but not all-of the salt. · Avoid water that is naturally high in sodium or that has been treated with water softeners, which add sodium. Call your local water company to find out the sodium content of your water supply. If you buy bottled water, read the label and choose a sodium-free brand. Avoid high-sodium foods  · Avoid eating:  ¨ Smoked, cured, salted, and canned meat, fish, and poultry. ¨ Ham, block, hot dogs, and luncheon meats. ¨ Regular, hard, and processed cheese and regular peanut butter. ¨ Crackers with salted tops, and other salted snack foods such as pretzels, chips, and salted popcorn. ¨ Frozen prepared meals, unless labeled low-sodium. ¨ Canned and dried soups, broths, and bouillon, unless labeled sodium-free or low-sodium.   ¨ Canned vegetables, unless labeled sodium-free or low-sodium. ¨ Western [employer] fries, pizza, tacos, and other fast foods. ¨ Pickles, olives, ketchup, and other condiments, especially soy sauce, unless labeled sodium-free or low-sodium. Where can you learn more? Go to http://quentin-stefania.info/. Enter N749 in the search box to learn more about \"Low Sodium Diet (2,000 Milligram): Care Instructions. \"  Current as of: May 12, 2017  Content Version: 11.4  © 1730-0675 [employer]. Care instructions adapted under license by [employer] (which disclaims liability or warranty for this information). If you have questions about a medical condition or this instruction, always ask your healthcare professional. [address] any warranty or liability for your use of this information.

## 2018-04-09 ENCOUNTER — OFFICE VISIT (OUTPATIENT)
Dept: FAMILY MEDICINE CLINIC | Age: 33
End: 2018-04-09

## 2018-04-09 VITALS
RESPIRATION RATE: 18 BRPM | HEIGHT: 65 IN | BODY MASS INDEX: 31.75 KG/M2 | HEART RATE: 89 BPM | SYSTOLIC BLOOD PRESSURE: 132 MMHG | OXYGEN SATURATION: 99 % | WEIGHT: 190.6 LBS | DIASTOLIC BLOOD PRESSURE: 88 MMHG

## 2018-04-09 DIAGNOSIS — I10 ESSENTIAL HYPERTENSION: Primary | ICD-10-CM

## 2018-04-09 NOTE — PROGRESS NOTES
Chief Complaint   Patient presents with    Hypertension     2 week follow up          HPI:       is a 35 y.o. female. She is an . Was seen last month after getting back from a trip to [facility]. She had been dizzy, just didn't feel right, took /110 to 150/100. Added HCTZ. Her BP was still 140/90s at home. Was on Added back Amlodipine last visit. New Issues:  She got her pap with Dr. Pastor Sanders and they discussed possible birth control alternatives. She has had the Mirena in the past.  She also admits to taking a good amount of Ibuprofen. Also drinks 3 or so beers frequently. Allergies   Allergen Reactions    Augmentin [Amoxicillin-Pot Clavulanate] Other (comments)     Intolerance to drug       Current Outpatient Prescriptions   Medication Sig    TRI-SPRINTEC, 28, 0.18/0.215/0.25 mg-35 mcg (28) tab     amLODIPine (NORVASC) 5 mg tablet Take 1 Tab by mouth daily.  hydroCHLOROthiazide (HYDRODIURIL) 12.5 mg tablet Take 1 Tab by mouth daily. Indications: hypertension    citalopram (CELEXA) 20 mg tablet TAKE ONE TABLET BY MOUTH ONCE DAILY    phentermine 30 mg capsule Take 30 mg by mouth every morning. No current facility-administered medications for this visit. No past medical history on file. Past Surgical History:   Procedure Laterality Date    HX GYN       I, Para I    HX TONSILLECTOMY         Social History     Social History    Marital status:      Spouse name: N/A    Number of children: 1    Years of education: N/A     Occupational History    insurance      Social History Main Topics    Smoking status: Former Smoker    Smokeless tobacco: Never Used    Alcohol use Yes      Comment: social    Drug use: No    Sexual activity: Yes     Partners: Male     Birth control/ protection: Pill     Other Topics Concern    None     Social History Narrative       No family history on file. Above history reviewed.       ROS:  Denies fever, chills, cough, chest pain, SOB,  nausea, vomiting, or diarrhea. Denies wt loss, wt gain, hemoptysis, hematochezia or melena. Physical Examination:    /88 (BP 1 Location: Right arm, BP Patient Position: Sitting)  Pulse 89  Resp 18  Ht 5' 5\" (1.651 m)  Wt 190 lb 9.6 oz (86.5 kg)  SpO2 99%  BMI 31.72 kg/m2    Wt Readings from Last 3 Encounters:   04/09/18 190 lb 9.6 oz (86.5 kg)   03/26/18 194 lb 3.2 oz (88.1 kg)   03/09/18 195 lb 9.6 oz (88.7 kg)      General: Alert and Ox3, Fluent speech  Neck:  Supple, no adenopathy, JVD, mass or bruit  Chest:  Clear to Ausculation, without wheezes, rales, rubs or ronchi  Cardiac: RRR  Extremities:  No cyanosis, clubbing or edema  Neurologic:  Ambulatory without assist, CN 2-12 grossly intact. Moves all extremities. Skin: no rash  Lymphadenopathy: no cervical or supraclavicular nodes    ASSESSMENT AND PLAN:     1. Essential hypertension  Patient to keep working on lifestyle changes including cutting back on   She may consider switching to an IUD as well.     - METABOLIC PANEL, BASIC    RTc in 3 months    Siddhartha Stapleton NP

## 2018-04-10 LAB
BUN SERPL-MCNC: 11 MG/DL (ref 6–20)
BUN/CREAT SERPL: 20 (ref 9–23)
CALCIUM SERPL-MCNC: 9.5 MG/DL (ref 8.7–10.2)
CHLORIDE SERPL-SCNC: 96 MMOL/L (ref 96–106)
CO2 SERPL-SCNC: 22 MMOL/L (ref 18–29)
CREAT SERPL-MCNC: 0.55 MG/DL (ref 0.57–1)
GFR SERPLBLD CREATININE-BSD FMLA CKD-EPI: 124 ML/MIN/1.73
GFR SERPLBLD CREATININE-BSD FMLA CKD-EPI: 143 ML/MIN/1.73
GLUCOSE SERPL-MCNC: 67 MG/DL (ref 65–99)
POTASSIUM SERPL-SCNC: 4.1 MMOL/L (ref 3.5–5.2)
SODIUM SERPL-SCNC: 139 MMOL/L (ref 134–144)

## 2018-06-05 DIAGNOSIS — I10 ESSENTIAL HYPERTENSION: ICD-10-CM

## 2018-06-05 RX ORDER — HYDROCHLOROTHIAZIDE 12.5 MG/1
TABLET ORAL
Qty: 30 TAB | Refills: 2 | Status: SHIPPED | OUTPATIENT
Start: 2018-06-05 | End: 2018-09-22 | Stop reason: SDUPTHER

## 2018-09-22 DIAGNOSIS — I10 ESSENTIAL HYPERTENSION: ICD-10-CM

## 2018-09-24 RX ORDER — HYDROCHLOROTHIAZIDE 12.5 MG/1
TABLET ORAL
Qty: 30 TAB | Refills: 2 | Status: SHIPPED | OUTPATIENT
Start: 2018-09-24 | End: 2019-03-01 | Stop reason: SDUPTHER

## 2019-03-01 DIAGNOSIS — I10 ESSENTIAL HYPERTENSION: ICD-10-CM

## 2019-03-01 RX ORDER — HYDROCHLOROTHIAZIDE 12.5 MG/1
TABLET ORAL
Qty: 30 TAB | Refills: 2 | Status: SHIPPED | OUTPATIENT
Start: 2019-03-01 | End: 2019-06-06 | Stop reason: SDUPTHER

## 2019-05-08 DIAGNOSIS — F41.1 GAD (GENERALIZED ANXIETY DISORDER): ICD-10-CM

## 2019-05-08 RX ORDER — CITALOPRAM 20 MG/1
TABLET, FILM COATED ORAL
Qty: 90 TAB | Refills: 4 | Status: SHIPPED | OUTPATIENT
Start: 2019-05-08 | End: 2019-10-01

## 2019-10-22 PROBLEM — F32.0 CURRENT MILD EPISODE OF MAJOR DEPRESSIVE DISORDER (HCC): Chronic | Status: ACTIVE | Noted: 2019-10-22

## 2019-12-12 PROBLEM — E66.01 SEVERE OBESITY (HCC): Status: ACTIVE | Noted: 2019-12-12

## 2021-07-07 ENCOUNTER — VIRTUAL VISIT (OUTPATIENT)
Dept: FAMILY MEDICINE CLINIC | Age: 36
End: 2021-07-07
Payer: COMMERCIAL

## 2021-07-07 DIAGNOSIS — F41.1 GAD (GENERALIZED ANXIETY DISORDER): ICD-10-CM

## 2021-07-07 DIAGNOSIS — E66.09 CLASS 2 OBESITY DUE TO EXCESS CALORIES WITHOUT SERIOUS COMORBIDITY WITH BODY MASS INDEX (BMI) OF 37.0 TO 37.9 IN ADULT: Primary | ICD-10-CM

## 2021-07-07 DIAGNOSIS — I10 ESSENTIAL HYPERTENSION: ICD-10-CM

## 2021-07-07 DIAGNOSIS — F32.0 CURRENT MILD EPISODE OF MAJOR DEPRESSIVE DISORDER WITHOUT PRIOR EPISODE (HCC): ICD-10-CM

## 2021-07-07 PROCEDURE — 99213 OFFICE O/P EST LOW 20 MIN: CPT | Performed by: NURSE PRACTITIONER

## 2021-07-07 RX ORDER — TOPIRAMATE 50 MG/1
50 TABLET, FILM COATED ORAL DAILY
Qty: 30 TABLET | Refills: 0 | Status: SHIPPED | OUTPATIENT
Start: 2021-07-07 | End: 2022-10-07 | Stop reason: SDUPTHER

## 2021-07-07 NOTE — PROGRESS NOTES
Consent:  She and/or her healthcare decision maker is aware that this patient-initiated Telehealth encounter is a billable service, with coverage as determined by her insurance carrier. She is aware that she may receive a bill and has provided verbal consent to proceed: Yes    I was at home while conducting this encounter. Dee Rodriguez is a 39 y.o. female who was seen by synchronous (real-time) audio-video technology on 7/7/2021. Pt was seen at home. No other participants in this encounter. Subjective:     CC: weight gain, obesity, HTN    Obesity  She is 5'5 and up to 227 pounds. BMI 37. TSH has been checked several times and always normal. She struggles with diet and exercise. She has tried 2 rounds of Phentermine in the past and wants to try a third rounds since she never completed the full 3 months last time it was prescribed, however I pointed out to her today that once she comes off the phentermine, the weight is most likely going to come right back. We discussed the option of starting something she can take more long term such as Contrave. She is willing to try it. Anxiety and depression  Symptoms well controlled on Effexor. HTN  She is on Losartan 100mg and HCTZ 25mg daily. Check her BP at home, it stays below 140/90. Denies any CP, SOB, or dizziness. Healthcare maintenance  PAP: last done in 2017, due for repeat, she states she will schedule an appt soon with her OBGYN. Goes to [address] vaccine- still considering getting this. PMH, SH, Medications/Allergies: reviewed, on chart. Current Outpatient Medications   Medication Sig    losartan (COZAAR) 100 mg tablet Take 1 tablet by mouth once daily    hydroCHLOROthiazide (HYDRODIURIL) 25 mg tablet TAKE 1 TABLET BY MOUTH ONCE DAILY FOR HIGH BLOOD PRESSURE    venlafaxine-SR (EFFEXOR-XR) 75 mg capsule Take 1 capsule by mouth once daily    phentermine (ADIPEX-P) 37.5 mg tablet Take 1 Tab by mouth every morning. Max Daily Amount: 37.5 mg. No current facility-administered medications for this visit. Allergies   Allergen Reactions    Augmentin [Amoxicillin-Pot Clavulanate] Other (comments)     Intolerance to drug       ROS:  Gen: denies fever, chills, or fatigue, +weight gain  HEENT:denies H/A or vision changes  Resp: denies dyspnea  CV: denies chest pain, pressure, or palpitations  Extremeties: denies edema  Musculoskeletal: no joint pain, stiffness, or muscle cramps  Neuro: denies numbness/tingling or dizziness  Skin: denies rashes or new lesions     VS review:   Wt Readings from Last 3 Encounters:   07/22/20 212 lb (96.2 kg)   02/20/20 209 lb 6 oz (95 kg)   01/23/20 214 lb 2 oz (97.1 kg)     BP Readings from Last 3 Encounters:   02/20/20 136/86   01/23/20 114/77   12/12/19 127/75       Objective:     General: alert, cooperative, no distress   Mental  status: mental status: alert, oriented to person, place, and time, normal mood, behavior, speech, dress, motor activity, and thought processes   Resp: resp: normal effort and no respiratory distress   Neuro: neuro: no gross deficits   Skin: skin: no discoloration or lesions of concern on visible areas       Assessment & Plan:     Obesity, BMI 37  Current weight: 227 pounds  Start Contrave: 1 pill qAM x 1 week, then increase to 1 pill BID x 1 week, then increase to 2 pills qAM and 1 pill aPM x 1 week, then increase to 2 pills BID  Side effects reviewed, call for any problems  Informed her if she starts seeing results with a lower dose of the Contrave she can stay at that dose to avoid potential side effects  Cont to reduce intake of carbs, sugar, and fat  Exercise  F/U 1 month     HTN  BP has been checked at home and is at goal  Cont Losartan 100mg daily  Cont HCTZ 25mg daily  Notify PCP for leg cramps, CP, SOB, dizziness, or other problems  Work on weight loss  F/U 6 months    Anxiety and depression  Stable  Cont Effexor  F/U 6 months or sooner prn        Time-based coding, delete if not needed: I spent at least 15 minutes with this established patient, and >50% of the time was spent counseling and/or coordinating care regarding strategies for weight loss and HTN. Luis Simmons NP      Due to this being a TeleHealth evaluation, many elements of the physical examination are unable to be assessed. We discussed the expected course, resolution and complications of the diagnosis(es) in detail. Medication risks, benefits, costs, interactions, and alternatives were discussed as indicated. I advised her to contact the office if her condition worsens, changes or fails to improve as anticipated. She expressed understanding with the diagnosis(es) and plan. Pursuant to the emergency declaration under the [facility], [facility] waiver authority and the [employer] General Act, this Virtual  Visit was conducted, with patient's consent, to reduce the patient's risk of exposure to COVID-19 and provide continuity of care for an established patient. Services were provided through a video synchronous discussion virtually to substitute for in-person clinic visit.     CPT Codes 46968-56035 for Established Patients may apply to this Telehealth Visit

## 2022-03-18 PROBLEM — E66.01 SEVERE OBESITY (HCC): Status: ACTIVE | Noted: 2019-12-12

## 2022-03-19 PROBLEM — F41.1 GAD (GENERALIZED ANXIETY DISORDER): Status: ACTIVE | Noted: 2017-02-27

## 2022-03-19 PROBLEM — F32.0 CURRENT MILD EPISODE OF MAJOR DEPRESSIVE DISORDER (HCC): Status: ACTIVE | Noted: 2019-10-22

## 2022-03-19 PROBLEM — I10 ESSENTIAL HYPERTENSION: Status: ACTIVE | Noted: 2017-02-27

## 2022-09-13 DIAGNOSIS — I10 ESSENTIAL HYPERTENSION: ICD-10-CM

## 2022-09-13 RX ORDER — HYDROCHLOROTHIAZIDE 25 MG/1
TABLET ORAL
Qty: 30 TABLET | Refills: 0 | Status: SHIPPED | OUTPATIENT
Start: 2022-09-13 | End: 2022-10-07 | Stop reason: SDUPTHER

## 2022-10-07 ENCOUNTER — OFFICE VISIT (OUTPATIENT)
Dept: FAMILY MEDICINE CLINIC | Age: 37
End: 2022-10-07
Payer: COMMERCIAL

## 2022-10-07 VITALS
OXYGEN SATURATION: 98 % | WEIGHT: 227 LBS | HEART RATE: 88 BPM | RESPIRATION RATE: 18 BRPM | SYSTOLIC BLOOD PRESSURE: 117 MMHG | TEMPERATURE: 97 F | BODY MASS INDEX: 37.82 KG/M2 | DIASTOLIC BLOOD PRESSURE: 76 MMHG | HEIGHT: 65 IN

## 2022-10-07 DIAGNOSIS — I10 ESSENTIAL HYPERTENSION: Primary | ICD-10-CM

## 2022-10-07 DIAGNOSIS — E66.09 CLASS 2 OBESITY DUE TO EXCESS CALORIES WITHOUT SERIOUS COMORBIDITY WITH BODY MASS INDEX (BMI) OF 37.0 TO 37.9 IN ADULT: ICD-10-CM

## 2022-10-07 DIAGNOSIS — Z11.59 ENCOUNTER FOR HEPATITIS C SCREENING TEST FOR LOW RISK PATIENT: ICD-10-CM

## 2022-10-07 DIAGNOSIS — F32.5 MAJOR DEPRESSION IN REMISSION (HCC): ICD-10-CM

## 2022-10-07 PROBLEM — F41.1 GAD (GENERALIZED ANXIETY DISORDER): Status: RESOLVED | Noted: 2017-02-27 | Resolved: 2022-10-07

## 2022-10-07 PROCEDURE — 99395 PREV VISIT EST AGE 18-39: CPT | Performed by: NURSE PRACTITIONER

## 2022-10-07 RX ORDER — VENLAFAXINE HYDROCHLORIDE 75 MG/1
75 CAPSULE, EXTENDED RELEASE ORAL DAILY
Qty: 90 CAPSULE | Refills: 3 | Status: SHIPPED | OUTPATIENT
Start: 2022-10-07

## 2022-10-07 RX ORDER — LOSARTAN POTASSIUM 100 MG/1
100 TABLET ORAL DAILY
Qty: 90 TABLET | Refills: 3 | Status: SHIPPED | OUTPATIENT
Start: 2022-10-07

## 2022-10-07 RX ORDER — HYDROCHLOROTHIAZIDE 25 MG/1
TABLET ORAL
Qty: 90 TABLET | Refills: 3 | Status: SHIPPED | OUTPATIENT
Start: 2022-10-07 | End: 2022-10-14 | Stop reason: SDUPTHER

## 2022-10-07 RX ORDER — TOPIRAMATE 50 MG/1
50 TABLET, FILM COATED ORAL
Qty: 30 TABLET | Refills: 0 | Status: SHIPPED | OUTPATIENT
Start: 2022-10-07

## 2022-10-07 NOTE — PROGRESS NOTES
1. \"Have you been to the ER, urgent care clinic since your last visit? Hospitalized since your last visit? \" No    2. \"Have you seen or consulted any other health care providers outside of the [address] [place] [address] since your last visit? \" No     3. For patients aged 39-70: Has the patient had a colonoscopy / FIT/ Cologuard? NA - based on age      If the patient is female:    4. For patients aged 41-77: Has the patient had a mammogram within the past 2 years? NA - based on age or sex      11. For patients aged 21-65: Has the patient had a pap smear?  NA - based on age or sex

## 2022-10-07 NOTE — PROGRESS NOTES
Subjective:     CC: annual physical    Bayron Almeida is a 40 y.o. female who presents today for an annual physical exam.      Obesity  She is 5'5 and up to 227 pounds. BMI 37. No change from last year. TSH has been checked several times and always normal.   She struggles with diet and exercise. She has tried 2 rounds of Phentermine. Could not afford. Contrave. Never picked up Topamax. Would like to try it now. Major depression, in full remission  Symptoms well controlled on Effexor. Needs refills      HTN  BP today is at goal. Bp at home has been as high in the 160s about once a week and when this happens she takes an extra HCTZ which brings it down. She is on Losartan 100mg and HCTZ 25mg daily. Denies any CP, SOB, or dizziness. Healthcare maintenance  PAP: last done in 2017 , due for repeat, states she will schedule an appt soon with her OBGYN. Goes to [address] [place] [address]    PNA vaccine-_declines    Covid vaccine- rec'd the first two vaccines, not interested in the boosters. Flu shot-declines    One time hep c screening done today      Patient Active Problem List   Diagnosis Code    Essential hypertension I10    NEREYDA (generalized anxiety disorder) F41.1    BMI 32.0-32.9,adult Z68.32    Current mild episode of major depressive disorder (HCC) F32.0    Severe obesity (HCC) E66.01       No past medical history on file. Current Outpatient Medications:     hydroCHLOROthiazide (HYDRODIURIL) 25 mg tablet, Take 1 pill daily, Disp: 30 Tablet, Rfl: 0    losartan (COZAAR) 100 mg tablet, Take 1 tablet by mouth once daily, Disp: 30 Tablet, Rfl: 0    venlafaxine-SR (EFFEXOR-XR) 75 mg capsule, Take 1 capsule by mouth once daily, Disp: 30 Capsule, Rfl: 0    topiramate (TOPAMAX) 50 mg tablet, Take 1 Tablet by mouth daily. , Disp: 30 Tablet, Rfl: 0    Allergies   Allergen Reactions    Augmentin [Amoxicillin-Pot Clavulanate] Other (comments)     Intolerance to drug       Past Surgical History:   Procedure Laterality Date    HX GYN       I, Para I    HX TONSILLECTOMY  2004       Social History     Tobacco Use   Smoking Status Some Days   Smokeless Tobacco Never       Social History     Socioeconomic History    Marital status:     Number of children: 1   Occupational History    Occupation: insurance   Tobacco Use    Smoking status: Some Days    Smokeless tobacco: Never   Substance and Sexual Activity    Alcohol use: Yes     Comment: social    Drug use: No    Sexual activity: Yes     Partners: Male     Birth control/protection: Pill       No family history on file. ROS:  Gen: denies fever, chills, or fatigue  HEENT:denies H/A, nasal congestion, or sore throat  Resp: denies dyspnea, cough, or wheezing  CV: denies chest pain, pressure, or palpitations  Extremeties: denies edema  GI[de-identified] denies abdominal pain, nausea, vomiting, diarrhea, or constipation  : denies dysuria, hematuria, urinary frequency or urgency  Musculoskeletal: no joint pain, stiffness, or muscle cramps  Neuro: denies numbness/tingling or dizziness  Skin: denies rashes or new lesions   Psych: denies anxiety, depression, vikki, or other changes in mood    Objective:     Visit Vitals  /76 (BP 1 Location: Left upper arm, BP Patient Position: Sitting, BP Cuff Size: Adult)   Pulse 88   Temp 97 °F (36.1 °C) (Temporal)   Resp 18   Ht 5' 5\" (1.651 m)   Wt 227 lb (103 kg)   SpO2 98%   BMI 37.77 kg/m²       General: Alert and oriented. No acute distress. Well nourished. HEENT :  Eyes: Sclera white, conjunctiva clear. PERRLA. Extra ocular movements intact. Neck: Supple with FROM. Lungs: Breathing even and unlabored. All lobes clear to auscultation bilaterally   Heart :RRR, S1 and S2 normal intensity, no extra heart sounds  Extremities: Non-edematous  Musculoskeletal: No joint pain, heat, erythema, or swelling. FROM in all joints. Neuro: Cranial nerves grossly normal.  Psych: Mood and thought content appropriate for situation.  Dressed appropriately and with good hygiene. Skin: Warm, dry, and intact. No lesions or discoloration. Assessment/ Plan:     Obesity, BMI 37  Current weight: 227 pounds  Start Topamax 50mg qhS- s/e reviewed  Cont to reduce intake of carbs, sugar, and fat  Exercise  F/U 1 month     HTN  BP at goal here in office  At home it will be elevated once a week  OK to take extra HCTZ dose as needed  Cont Losartan 100mg daily  Cont HCTZ 25mg daily  Notify PCP for leg cramps, CP, SOB, dizziness, or other problems  Work on weight loss  F/U 1 year    Anxiety and depression  Stable  Cont Effexor  F/U 1 year sooner prn    Healthcare maintenance  PAP: last done in 2017 , due for repeat, states she will schedule an appt soon with her OBGYN. Goes to [address] [place] [address]    PNA vaccine-_declines    Covid vaccine- rec'd the first two vaccines, not interested in the boosters. Flu shot-declines    One time hep c screening done today    Verbal and written instructions (see AVS) provided. Patient expresses understanding of diagnosis and treatment plan. Health Maintenance Due   Topic Date Due    Hepatitis C Screening  Never done    COVID-19 Vaccine (1) Never done    Pneumococcal 0-64 years (1 - PCV) Never done    DTaP/Tdap/Td series (2 - Tdap) 02/02/2005    Cervical cancer screen  03/30/2022    Depression Monitoring  07/07/2022    Flu Vaccine (1) 08/01/2022               Savage GAMEZ  [place] TIARA Sanders

## 2022-10-10 DIAGNOSIS — E87.5 HYPERKALEMIA: Primary | ICD-10-CM

## 2022-10-10 LAB
ALBUMIN SERPL-MCNC: 4.4 G/DL (ref 3.8–4.8)
ALBUMIN/GLOB SERPL: 1.8 {RATIO} (ref 1.2–2.2)
ALP SERPL-CCNC: 75 IU/L (ref 44–121)
ALT SERPL-CCNC: 38 IU/L (ref 0–32)
AST SERPL-CCNC: 27 IU/L (ref 0–40)
BILIRUB SERPL-MCNC: 0.5 MG/DL (ref 0–1.2)
BUN SERPL-MCNC: 14 MG/DL (ref 6–20)
BUN/CREAT SERPL: 18 (ref 9–23)
CALCIUM SERPL-MCNC: 9.6 MG/DL (ref 8.7–10.2)
CHLORIDE SERPL-SCNC: 102 MMOL/L (ref 96–106)
CO2 SERPL-SCNC: 17 MMOL/L (ref 20–29)
CREAT SERPL-MCNC: 0.79 MG/DL (ref 0.57–1)
EGFR: 99 ML/MIN/1.73
ERYTHROCYTE [DISTWIDTH] IN BLOOD BY AUTOMATED COUNT: 11.6 % (ref 11.7–15.4)
GLOBULIN SER CALC-MCNC: 2.5 G/DL (ref 1.5–4.5)
GLUCOSE SERPL-MCNC: 86 MG/DL (ref 70–99)
HCT VFR BLD AUTO: 41 % (ref 34–46.6)
HCV AB S/CO SERPL IA: <0.1 S/CO RATIO (ref 0–0.9)
HGB BLD-MCNC: 13.8 G/DL (ref 11.1–15.9)
MCH RBC QN AUTO: 29.7 PG (ref 26.6–33)
MCHC RBC AUTO-ENTMCNC: 33.7 G/DL (ref 31.5–35.7)
MCV RBC AUTO: 88 FL (ref 79–97)
PLATELET # BLD AUTO: 309 X10E3/UL (ref 150–450)
POTASSIUM SERPL-SCNC: 5.5 MMOL/L (ref 3.5–5.2)
PROT SERPL-MCNC: 6.9 G/DL (ref 6–8.5)
RBC # BLD AUTO: 4.65 X10E6/UL (ref 3.77–5.28)
SODIUM SERPL-SCNC: 140 MMOL/L (ref 134–144)
WBC # BLD AUTO: 6 X10E3/UL (ref 3.4–10.8)

## 2022-10-10 NOTE — PROGRESS NOTES
Labs look good other than potassium which is elevated. I'd like her to come back and recheck this this week.  Future order placed

## 2022-10-13 ENCOUNTER — LAB ONLY (OUTPATIENT)
Dept: FAMILY MEDICINE CLINIC | Age: 37
End: 2022-10-13
Payer: COMMERCIAL

## 2022-10-13 DIAGNOSIS — E87.5 HYPERKALEMIA: ICD-10-CM

## 2022-10-13 LAB — POTASSIUM SERPL-SCNC: 3.4 MMOL/L (ref 3.5–5.1)

## 2022-10-13 PROCEDURE — 36415 COLL VENOUS BLD VENIPUNCTURE: CPT | Performed by: NURSE PRACTITIONER

## 2022-10-14 DIAGNOSIS — I10 ESSENTIAL HYPERTENSION: ICD-10-CM

## 2022-10-14 RX ORDER — HYDROCHLOROTHIAZIDE 25 MG/1
TABLET ORAL
Qty: 90 TABLET | Refills: 3
Start: 2022-10-14

## 2022-10-14 RX ORDER — POTASSIUM CHLORIDE 750 MG/1
TABLET, EXTENDED RELEASE ORAL
Qty: 30 TABLET | Refills: 0 | Status: SHIPPED | OUTPATIENT
Start: 2022-10-14

## 2022-10-14 NOTE — PROGRESS NOTES
Potassium level is not slightly low. HCTZ causes potassium level to drop so if she is taking it twice a day she needs to take a potassium supplement with the second dose. Script sent.  Let's recheck in 1 month

## 2023-11-03 ENCOUNTER — APPOINTMENT (OUTPATIENT)
Facility: HOSPITAL | Age: 38
End: 2023-11-03
Payer: COMMERCIAL

## 2023-11-03 ENCOUNTER — HOSPITAL ENCOUNTER (EMERGENCY)
Facility: HOSPITAL | Age: 38
Discharge: HOME | End: 2023-11-03
Attending: EMERGENCY MEDICINE
Payer: COMMERCIAL

## 2023-11-03 VITALS
HEIGHT: 66 IN | SYSTOLIC BLOOD PRESSURE: 150 MMHG | OXYGEN SATURATION: 100 % | RESPIRATION RATE: 14 BRPM | WEIGHT: 230 LBS | HEART RATE: 86 BPM | BODY MASS INDEX: 36.96 KG/M2 | TEMPERATURE: 98.6 F | DIASTOLIC BLOOD PRESSURE: 86 MMHG

## 2023-11-03 DIAGNOSIS — S93.601A SPRAIN OF RIGHT FOOT, INITIAL ENCOUNTER: Primary | ICD-10-CM

## 2023-11-03 PROCEDURE — 73610 X-RAY EXAM OF ANKLE: CPT

## 2023-11-03 PROCEDURE — 99283 EMERGENCY DEPT VISIT LOW MDM: CPT

## 2023-11-03 PROCEDURE — 73630 X-RAY EXAM OF FOOT: CPT

## 2023-11-03 ASSESSMENT — PAIN DESCRIPTION - LOCATION: LOCATION: ANKLE

## 2023-11-03 ASSESSMENT — ENCOUNTER SYMPTOMS
SHORTNESS OF BREATH: 0
NAUSEA: 0
DIARRHEA: 0
VOMITING: 0
EYE REDNESS: 0
ABDOMINAL PAIN: 0
SORE THROAT: 0
COUGH: 0

## 2023-11-03 ASSESSMENT — PAIN DESCRIPTION - DESCRIPTORS: DESCRIPTORS: ACHING

## 2023-11-03 ASSESSMENT — PAIN DESCRIPTION - ORIENTATION: ORIENTATION: RIGHT

## 2023-11-03 ASSESSMENT — PAIN - FUNCTIONAL ASSESSMENT: PAIN_FUNCTIONAL_ASSESSMENT: 0-10

## 2023-11-03 ASSESSMENT — PAIN SCALES - GENERAL: PAINLEVEL_OUTOF10: 9

## 2023-11-03 ASSESSMENT — LIFESTYLE VARIABLES: HOW OFTEN DO YOU HAVE A DRINK CONTAINING ALCOHOL: NEVER

## 2023-11-03 NOTE — ED PROVIDER NOTES
EMERGENCY DEPARTMENT HISTORY AND PHYSICAL EXAM      Date: 11/3/2023  Patient Name: Shira Rangel    History of Presenting Illness     Chief Complaint   Patient presents with    Ankle Pain       History Provided By: Patient    HPI: Shira Rangel, 45 y.o. female with past medical history listed below, presents via private vehicle to the ED with cc of right ankle/foot injury. Patient reports around 10 AM she tripped coming down a step and twisted her ankle. She complains of pain in her lateral midfoot area. She has been able to bear weight but only if she walks on her heel. No other injuries. No treatment prior to arrival.  She reports mild swelling. No rash or bruising. No open wounds. There are no other complaints, changes, or physical findings at this time. PCP: SIRIA Higgins NP    No current facility-administered medications on file prior to encounter. Current Outpatient Medications on File Prior to Encounter   Medication Sig Dispense Refill    hydroCHLOROthiazide (HYDRODIURIL) 25 MG tablet Take 1-2 pills daily for blood pressure      losartan (COZAAR) 100 MG tablet Take 100 mg by mouth daily      potassium chloride (KLOR-CON M) 10 MEQ extended release tablet Take 1 tab daily with second dose of Lasix as needed      topiramate (TOPAMAX) 50 MG tablet Take 1 tablet by mouth nightly      venlafaxine (EFFEXOR XR) 75 MG extended release capsule Take 75 mg by mouth daily         Past History     Past Medical History:  History reviewed. No pertinent past medical history. Past Surgical History:  Past Surgical History:   Procedure Laterality Date    GYN       I, Para I    TONSILLECTOMY  2004       Family History:  History reviewed. No pertinent family history. Social History:  Social History     Tobacco Use    Smoking status: Some Days    Smokeless tobacco: Never   Substance Use Topics    Alcohol use: Yes    Drug use: No       Allergies:   Allergies   Allergen Reactions

## 2023-11-03 NOTE — ED NOTES
Written and verbal discharge instructions reviewed with patient. All discharge medications reviewed and explained. Understanding verbalized, all questions answered.  Ambulated out     Patric Bullock, [address] [place] [address]  11/03/23 [id number]

## 2023-11-03 NOTE — ED NOTES
Right ankle pain after tripping down a step today at 1000 ,  ice applied     Lino Grajeda RN  11/03/23 [phone]

## 2023-11-13 RX ORDER — HYDROCHLOROTHIAZIDE 25 MG/1
25 TABLET ORAL DAILY
Qty: 30 TABLET | Refills: 0 | Status: SHIPPED | OUTPATIENT
Start: 2023-11-13

## 2023-11-13 NOTE — TELEPHONE ENCOUNTER
Patient requesting refill on     Requested Prescriptions     Pending Prescriptions Disp Refills    hydroCHLOROthiazide (HYDRODIURIL) 25 MG tablet [Pharmacy Med Name: hydroCHLOROthiazide 25 MG Oral Tablet] 90 tablet 0     Sig: Take 1 tablet by mouth once daily        Last OV 10/7/2022

## 2023-12-14 ENCOUNTER — TELEPHONE (OUTPATIENT)
Age: 38
End: 2023-12-14

## 2023-12-14 RX ORDER — LOSARTAN POTASSIUM 100 MG/1
100 TABLET ORAL DAILY
Qty: 30 TABLET | Refills: 0 | Status: SHIPPED | OUTPATIENT
Start: 2023-12-14

## 2023-12-14 RX ORDER — HYDROCHLOROTHIAZIDE 25 MG/1
25 TABLET ORAL DAILY
Qty: 90 TABLET | Refills: 0 | OUTPATIENT
Start: 2023-12-14

## 2023-12-14 RX ORDER — HYDROCHLOROTHIAZIDE 25 MG/1
25 TABLET ORAL DAILY
Qty: 30 TABLET | Refills: 0 | Status: SHIPPED | OUTPATIENT
Start: 2023-12-14

## 2023-12-14 RX ORDER — VENLAFAXINE HYDROCHLORIDE 75 MG/1
75 CAPSULE, EXTENDED RELEASE ORAL DAILY
Qty: 90 CAPSULE | Refills: 0 | OUTPATIENT
Start: 2023-12-14

## 2023-12-14 RX ORDER — VENLAFAXINE HYDROCHLORIDE 75 MG/1
75 CAPSULE, EXTENDED RELEASE ORAL DAILY
Qty: 30 CAPSULE | Refills: 0 | Status: SHIPPED | OUTPATIENT
Start: 2023-12-14

## 2023-12-14 RX ORDER — LOSARTAN POTASSIUM 100 MG/1
100 TABLET ORAL DAILY
Qty: 90 TABLET | Refills: 0 | OUTPATIENT
Start: 2023-12-14

## 2023-12-14 NOTE — TELEPHONE ENCOUNTER
Medication Refill Request    Jose Hare is requesting a refill of the following medication(s):     Venlafaxine 75 MG ER Caps  Losartan 100 MG tab  Hydrochlorothiazide 25 MG Tab    (Pt made a appointment for 12/22/23. Can we get enough to get to the appointment? Please send refill to:      [address], [address] [phone] Mary Jimenez [phone]  [facility] [address]  Phone: [phone] Fax: [phone]

## 2023-12-22 PROBLEM — K21.9 GASTROESOPHAGEAL REFLUX DISEASE: Status: ACTIVE | Noted: 2023-12-22

## 2024-10-15 ENCOUNTER — OFFICE VISIT (OUTPATIENT)
Age: 39
End: 2024-10-15
Payer: COMMERCIAL

## 2024-10-15 VITALS
HEIGHT: 65 IN | DIASTOLIC BLOOD PRESSURE: 78 MMHG | BODY MASS INDEX: 37.19 KG/M2 | OXYGEN SATURATION: 97 % | HEART RATE: 88 BPM | RESPIRATION RATE: 18 BRPM | TEMPERATURE: 98.8 F | WEIGHT: 223.25 LBS | SYSTOLIC BLOOD PRESSURE: 129 MMHG

## 2024-10-15 DIAGNOSIS — F32.5 MAJOR DEPRESSIVE DISORDER, SINGLE EPISODE, IN FULL REMISSION (HCC): ICD-10-CM

## 2024-10-15 DIAGNOSIS — Z00.00 ANNUAL PHYSICAL EXAM: Primary | ICD-10-CM

## 2024-10-15 DIAGNOSIS — K21.9 GASTROESOPHAGEAL REFLUX DISEASE, UNSPECIFIED WHETHER ESOPHAGITIS PRESENT: ICD-10-CM

## 2024-10-15 DIAGNOSIS — Z13.1 SCREENING FOR DIABETES MELLITUS: ICD-10-CM

## 2024-10-15 DIAGNOSIS — E66.01 SEVERE OBESITY: ICD-10-CM

## 2024-10-15 DIAGNOSIS — E78.00 PURE HYPERCHOLESTEROLEMIA: ICD-10-CM

## 2024-10-15 DIAGNOSIS — I10 PRIMARY HYPERTENSION: ICD-10-CM

## 2024-10-15 PROCEDURE — 36415 COLL VENOUS BLD VENIPUNCTURE: CPT | Performed by: NURSE PRACTITIONER

## 2024-10-15 PROCEDURE — 3078F DIAST BP <80 MM HG: CPT | Performed by: NURSE PRACTITIONER

## 2024-10-15 PROCEDURE — 99395 PREV VISIT EST AGE 18-39: CPT | Performed by: NURSE PRACTITIONER

## 2024-10-15 PROCEDURE — 3074F SYST BP LT 130 MM HG: CPT | Performed by: NURSE PRACTITIONER

## 2024-10-15 RX ORDER — LOSARTAN POTASSIUM 100 MG/1
100 TABLET ORAL DAILY
Qty: 90 TABLET | Refills: 3 | Status: SHIPPED | OUTPATIENT
Start: 2024-10-15

## 2024-10-15 RX ORDER — HYDROCHLOROTHIAZIDE 25 MG/1
25 TABLET ORAL DAILY
Qty: 90 TABLET | Refills: 3 | Status: SHIPPED | OUTPATIENT
Start: 2024-10-15

## 2024-10-15 RX ORDER — VENLAFAXINE HYDROCHLORIDE 75 MG/1
75 CAPSULE, EXTENDED RELEASE ORAL DAILY
Qty: 90 CAPSULE | Refills: 3 | Status: SHIPPED | OUTPATIENT
Start: 2024-10-15

## 2024-10-15 NOTE — PROGRESS NOTES
\"Have you been to the ER, urgent care clinic since your last visit?  Hospitalized since your last visit?\"    NO    “Have you seen or consulted any other health care providers outside our system since your last visit?”    NO     “Have you had a pap smear?”    YES - [facility]  Nurse/CMA to request most recent records if not in the chart    Date of last Cervical Cancer screen (HPV or PAP): 3/30/2017

## 2024-10-15 NOTE — PROGRESS NOTES
Subjective:     CC: HTN, HLD, GERD, depression    Lisa Crowe is a 39 y.o. female who presents today for annual follow up for HTN, HLD, GERD, and depression.    GERD  She is taking OTC Omeprazole daily with good control. She avoids triggers.    HTN  BP today at goal.  She takes Losartan 100mg daily and HCTZ 25mg daily.   Denies any CP, SOB, dizziness, or swelling.    HLD  Lab Results   Component Value Date    CHOL 218 (H) 12/22/2023    TRIG 147 12/22/2023    HDL 66 12/22/2023    .6 (H) 12/22/2023    VLDL 29.4 12/22/2023    CHOLHDLRATIO 3.3 12/22/2023      Low ASCVD risk. She is NOT on a statin.    Major depression in full remission  She is still taking Effexor and feeling good today.    Obesity  Weight today 223lbs, down from 230# in , up from 227# a year prior.   BMI 37  TSH has been normal.    She struggles with diet and exercise.   She would like to try a medication to help her lose weight.  She has tried 2 rounds of Phentermine.   Could not afford Contrave or Topamax.  Insurance does not cover the GLP- 1 agonists.  Today she was given the contact info for [facility] [facility] here in [place] . They offer a weight loss program.      Health maintenance  PAP- last done 2017 according to the records. Followed by Dr Sapphire Evans- she has no FH of breast cancer in 1st degree relative.     Flu shot- declines    Covid booster: declines      Patient Active Problem List   Diagnosis    Severe obesity    Essential hypertension    Major depression in remission (HCC)    Gastroesophageal reflux disease    Pure hypercholesterolemia         No past medical history on file.      Current Outpatient Medications:     omeprazole (PRILOSEC) 20 MG delayed release capsule, Take 1 capsule by mouth Daily, Disp: 90 capsule, Rfl: 3    hydroCHLOROthiazide (HYDRODIURIL) 25 MG tablet, Take 1 tablet by mouth daily, Disp: 90 tablet, Rfl: 3    losartan (COZAAR) 100 MG tablet, Take 1 tablet by mouth daily, Disp: 90 tablet, Rfl:

## 2024-10-16 LAB
ALBUMIN SERPL-MCNC: 4.2 G/DL (ref 3.5–5)
ALBUMIN/GLOB SERPL: 1.4 (ref 1.1–2.2)
ALP SERPL-CCNC: 68 U/L (ref 45–117)
ALT SERPL-CCNC: 31 U/L (ref 12–78)
ANION GAP SERPL CALC-SCNC: 6 MMOL/L (ref 2–12)
AST SERPL-CCNC: 16 U/L (ref 15–37)
BASOPHILS # BLD: 0 K/UL (ref 0–0.1)
BASOPHILS NFR BLD: 1 % (ref 0–1)
BILIRUB SERPL-MCNC: 0.8 MG/DL (ref 0.2–1)
BUN SERPL-MCNC: 12 MG/DL (ref 6–20)
BUN/CREAT SERPL: 15 (ref 12–20)
CALCIUM SERPL-MCNC: 9.8 MG/DL (ref 8.5–10.1)
CHLORIDE SERPL-SCNC: 101 MMOL/L (ref 97–108)
CHOLEST SERPL-MCNC: 201 MG/DL
CO2 SERPL-SCNC: 28 MMOL/L (ref 21–32)
CREAT SERPL-MCNC: 0.79 MG/DL (ref 0.55–1.02)
DIFFERENTIAL METHOD BLD: NORMAL
EOSINOPHIL # BLD: 0.1 K/UL (ref 0–0.4)
EOSINOPHIL NFR BLD: 1 % (ref 0–7)
ERYTHROCYTE [DISTWIDTH] IN BLOOD BY AUTOMATED COUNT: 11.9 % (ref 11.5–14.5)
EST. AVERAGE GLUCOSE BLD GHB EST-MCNC: 94 MG/DL
GLOBULIN SER CALC-MCNC: 3.1 G/DL (ref 2–4)
GLUCOSE SERPL-MCNC: 109 MG/DL (ref 65–100)
HBA1C MFR BLD: 4.9 % (ref 4–5.6)
HCT VFR BLD AUTO: 42.3 % (ref 35–47)
HDLC SERPL-MCNC: 63 MG/DL
HDLC SERPL: 3.2 (ref 0–5)
HGB BLD-MCNC: 13.9 G/DL (ref 11.5–16)
IMM GRANULOCYTES # BLD AUTO: 0 K/UL (ref 0–0.04)
IMM GRANULOCYTES NFR BLD AUTO: 0 % (ref 0–0.5)
LDLC SERPL CALC-MCNC: 92.4 MG/DL (ref 0–100)
LYMPHOCYTES # BLD: 1.6 K/UL (ref 0.8–3.5)
LYMPHOCYTES NFR BLD: 29 % (ref 12–49)
MCH RBC QN AUTO: 29.7 PG (ref 26–34)
MCHC RBC AUTO-ENTMCNC: 32.9 G/DL (ref 30–36.5)
MCV RBC AUTO: 90.4 FL (ref 80–99)
MONOCYTES # BLD: 0.6 K/UL (ref 0–1)
MONOCYTES NFR BLD: 10 % (ref 5–13)
NEUTS SEG # BLD: 3.3 K/UL (ref 1.8–8)
NEUTS SEG NFR BLD: 59 % (ref 32–75)
NRBC # BLD: 0 K/UL (ref 0–0.01)
NRBC BLD-RTO: 0 PER 100 WBC
PLATELET # BLD AUTO: 334 K/UL (ref 150–400)
PMV BLD AUTO: 11.4 FL (ref 8.9–12.9)
POTASSIUM SERPL-SCNC: 3.5 MMOL/L (ref 3.5–5.1)
PROT SERPL-MCNC: 7.3 G/DL (ref 6.4–8.2)
RBC # BLD AUTO: 4.68 M/UL (ref 3.8–5.2)
SODIUM SERPL-SCNC: 135 MMOL/L (ref 136–145)
TRIGL SERPL-MCNC: 228 MG/DL
TSH SERPL DL<=0.05 MIU/L-ACNC: 2.13 UIU/ML (ref 0.36–3.74)
VLDLC SERPL CALC-MCNC: 45.6 MG/DL
WBC # BLD AUTO: 5.6 K/UL (ref 3.6–11)

## 2025-04-22 ENCOUNTER — E-VISIT (OUTPATIENT)
Age: 40
End: 2025-04-22
Payer: COMMERCIAL

## 2025-04-22 DIAGNOSIS — L02.92 FURUNCLE: Primary | ICD-10-CM

## 2025-04-22 PROCEDURE — 99421 OL DIG E/M SVC 5-10 MIN: CPT | Performed by: NURSE PRACTITIONER

## 2025-04-22 RX ORDER — DOXYCYCLINE HYCLATE 100 MG
100 TABLET ORAL 2 TIMES DAILY
Qty: 20 TABLET | Refills: 0 | Status: SHIPPED | OUTPATIENT
Start: 2025-04-22 | End: 2025-05-02

## 2025-04-22 NOTE — PROGRESS NOTES
Furuncle  Start Doxycycline 100mg BID x 10 days  F/U prn if skin lesions worsen or do not heal  Time spent: 5-10 minutes

## 2025-04-29 ENCOUNTER — TELEPHONE (OUTPATIENT)
Age: 40
End: 2025-04-29

## 2025-04-29 NOTE — TELEPHONE ENCOUNTER
Patient initiated an e-visit about a week ago.  I responded via [employer] but I received a message stating she has not yet viewed the [employer] message.  Please let her know